# Patient Record
Sex: FEMALE | Race: WHITE | NOT HISPANIC OR LATINO | Employment: FULL TIME | URBAN - METROPOLITAN AREA
[De-identification: names, ages, dates, MRNs, and addresses within clinical notes are randomized per-mention and may not be internally consistent; named-entity substitution may affect disease eponyms.]

---

## 2017-10-09 ENCOUNTER — APPOINTMENT (OUTPATIENT)
Dept: SOCIAL WORK | Facility: CLINIC | Age: 32
End: 2017-10-09

## 2017-10-09 PROCEDURE — 90686 IIV4 VACC NO PRSV 0.5 ML IM: CPT | Performed by: REGISTERED NURSE

## 2017-12-04 ENCOUNTER — OFFICE VISIT (OUTPATIENT)
Dept: INTERNAL MEDICINE | Facility: MEDICAL CENTER | Age: 32
End: 2017-12-04
Payer: COMMERCIAL

## 2017-12-04 VITALS
SYSTOLIC BLOOD PRESSURE: 138 MMHG | OXYGEN SATURATION: 98 % | DIASTOLIC BLOOD PRESSURE: 93 MMHG | TEMPERATURE: 99.1 F | WEIGHT: 171.6 LBS | HEIGHT: 66 IN | HEART RATE: 88 BPM | BODY MASS INDEX: 27.58 KG/M2

## 2017-12-04 DIAGNOSIS — Z13.1 SCREENING FOR DIABETES MELLITUS: ICD-10-CM
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
## 2017-12-04 DIAGNOSIS — Z00.00 HEALTHCARE MAINTENANCE: ICD-10-CM

## 2017-12-04 DIAGNOSIS — F10.10 ALCOHOL ABUSE: ICD-10-CM

## 2017-12-04 DIAGNOSIS — Z13.220 SCREENING FOR LIPID DISORDERS: ICD-10-CM

## 2017-12-04 DIAGNOSIS — Z76.89 ESTABLISHING CARE WITH NEW DOCTOR, ENCOUNTER FOR: ICD-10-CM

## 2017-12-04 PROCEDURE — 99204 OFFICE O/P NEW MOD 45 MIN: CPT | Mod: GC | Performed by: INTERNAL MEDICINE

## 2017-12-04 ASSESSMENT — PAIN SCALES - GENERAL: PAINLEVEL: 5=MODERATE PAIN

## 2017-12-04 ASSESSMENT — PATIENT HEALTH QUESTIONNAIRE - PHQ9: CLINICAL INTERPRETATION OF PHQ2 SCORE: 0

## 2017-12-04 NOTE — PATIENT INSTRUCTIONS
Alcohol Problems  Most adults who drink alcohol drink in moderation (not a lot) are at low risk for developing problems related to their drinking. However, all drinkers, including low-risk drinkers, should know about the health risks connected with drinking alcohol.  RECOMMENDATIONS FOR LOW-RISK DRINKING   Drink in moderation. Moderate drinking is defined as follows:   · Men - no more than 2 drinks per day.  · Nonpregnant women - no more than 1 drink per day.  · Over age 65 - no more than 1 drink per day.  A standard drink is 12 grams of pure alcohol, which is equal to a 12 ounce bottle of beer or wine cooler, a 5 ounce glass of wine, or 1.5 ounces of distilled spirits (such as whiskey, rasheeda, vodka, or rum).   ABSTAIN FROM (DO NOT DRINK) ALCOHOL:  · When pregnant or considering pregnancy.  · When taking a medication that interacts with alcohol.  · If you are alcohol dependent.  · A medical condition that prohibits drinking alcohol (such as ulcer, liver disease, or heart disease).  DISCUSS WITH YOUR CAREGIVER:  · If you are at risk for coronary heart disease, discuss the potential benefits and risks of alcohol use: Light to moderate drinking is associated with lower rates of coronary heart disease in certain populations (for example, men over age 45 and postmenopausal women). Infrequent or nondrinkers are advised not to begin light to moderate drinking to reduce the risk of coronary heart disease so as to avoid creating an alcohol-related problem. Similar protective effects can likely be gained through proper diet and exercise.  · Women and the elderly have smaller amounts of body water than men. As a result women and the elderly achieve a higher blood alcohol concentration after drinking the same amount of alcohol.  · Exposing a fetus to alcohol can cause a broad range of birth defects referred to as Fetal Alcohol Syndrome (FAS) or Alcohol-Related Birth Defects (ARBD). Although FAS/ARBD is connected with  excessive alcohol consumption during pregnancy, studies also have reported neurobehavioral problems in infants born to mothers reporting drinking an average of 1 drink per day during pregnancy.  · Heavier drinking (the consumption of more than 4 drinks per occasion by men and more than 3 drinks per occasion by women) impairs learning (cognitive) and psychomotor functions and increases the risk of alcohol-related problems, including accidents and injuries.  CAGE QUESTIONS:   · Have you ever felt that you should Cut down on your drinking?  · Have people Annoyed you by criticizing your drinking?  · Have you ever felt bad or Guilty about your drinking?  · Have you ever had a drink first thing in the morning to steady your nerves or get rid of a hangover (Eye opener)?  If you answered positively to any of these questions: You may be at risk for alcohol-related problems if alcohol consumption is:   · Men: Greater than 14 drinks per week or more than 4 drinks per occasion.  · Women: Greater than 7 drinks per week or more than 3 drinks per occasion.  Do you or your family have a medical history of alcohol-related problems, such as:  · Blackouts.  · Sexual dysfunction.  · Depression.  · Trauma.  · Liver dysfunction.  · Sleep disorders.  · Hypertension.  · Chronic abdominal pain.  · Has your drinking ever caused you problems, such as problems with your family, problems with your work (or school) performance, or accidents/injuries?  · Do you have a compulsion to drink or a preoccupation with drinking?  · Do you have poor control or are you unable to stop drinking once you have started?  · Do you have to drink to avoid withdrawal symptoms?  · Do you have problems with withdrawal such as tremors, nausea, sweats, or mood disturbances?  · Does it take more alcohol than in the past to get you high?  · Do you feel a strong urge to drink?  · Do you change your plans so that you can have a drink?  · Do you ever drink in the morning to  relieve the shakes or a hangover?  If you have answered a number of the previous questions positively, it may be time for you to talk to your caregivers, family, and friends and see if they think you have a problem. Alcoholism is a chemical dependency that keeps getting worse and will eventually destroy your health and relationships. Many alcoholics end up dead, impoverished, or in detention. This is often the end result of all chemical dependency.  · Do not be discouraged if you are not ready to take action immediately.  · Decisions to change behavior often involve up and down desires to change and feeling like you cannot decide.  · Try to think more seriously about your drinking behavior.  · Think of the reasons to quit.  WHERE TO GO FOR ADDITIONAL INFORMATION   · The National [place] on Alcohol Abuse and Alcoholism (NIAAA)  www.niaaa.nih.gov  · National [place] on Alcoholism and Drug Dependence (NCADD)  www.ncadd.org  · American Society of Addiction Medicine (ASAM)  www.asam.org   Document Released: 12/18/2006 Document Revised: 03/11/2013 Document Reviewed: 08/05/2009  [employer]® Patient Information ©2014 [employer].
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
## 2017-12-15 ENCOUNTER — HOSPITAL ENCOUNTER (OUTPATIENT)
Dept: LAB | Facility: MEDICAL CENTER | Age: 32
End: 2017-12-15
Attending: STUDENT IN AN ORGANIZED HEALTH CARE EDUCATION/TRAINING PROGRAM
Payer: COMMERCIAL

## 2017-12-15 DIAGNOSIS — F10.10 ALCOHOL ABUSE: ICD-10-CM

## 2017-12-15 DIAGNOSIS — Z00.00 HEALTHCARE MAINTENANCE: ICD-10-CM

## 2017-12-15 DIAGNOSIS — Z13.1 SCREENING FOR DIABETES MELLITUS: ICD-10-CM

## 2017-12-15 LAB
ALBUMIN SERPL BCP-MCNC: 3.9 G/DL (ref 3.2–4.9)
ALBUMIN/GLOB SERPL: 1.1 G/DL
ALP SERPL-CCNC: 72 U/L (ref 30–99)
ALT SERPL-CCNC: 11 U/L (ref 2–50)
ANION GAP SERPL CALC-SCNC: 7 MMOL/L (ref 0–11.9)
AST SERPL-CCNC: 17 U/L (ref 12–45)
BASOPHILS # BLD AUTO: 0.8 % (ref 0–1.8)
BASOPHILS # BLD: 0.07 K/UL (ref 0–0.12)
BILIRUB SERPL-MCNC: 0.4 MG/DL (ref 0.1–1.5)
BUN SERPL-MCNC: 13 MG/DL (ref 8–22)
CALCIUM SERPL-MCNC: 9.5 MG/DL (ref 8.5–10.5)
CHLORIDE SERPL-SCNC: 106 MMOL/L (ref 96–112)
CHOLEST SERPL-MCNC: 209 MG/DL (ref 100–199)
CO2 SERPL-SCNC: 25 MMOL/L (ref 20–33)
CREAT SERPL-MCNC: 0.7 MG/DL (ref 0.5–1.4)
EOSINOPHIL # BLD AUTO: 0.2 K/UL (ref 0–0.51)
EOSINOPHIL NFR BLD: 2.4 % (ref 0–6.9)
ERYTHROCYTE [DISTWIDTH] IN BLOOD BY AUTOMATED COUNT: 44.3 FL (ref 35.9–50)
GFR SERPL CREATININE-BSD FRML MDRD: >60 ML/MIN/1.73 M 2
GLOBULIN SER CALC-MCNC: 3.6 G/DL (ref 1.9–3.5)
GLUCOSE SERPL-MCNC: 88 MG/DL (ref 65–99)
HCT VFR BLD AUTO: 43.8 % (ref 37–47)
HDLC SERPL-MCNC: 70 MG/DL
HGB BLD-MCNC: 14.3 G/DL (ref 12–16)
IMM GRANULOCYTES # BLD AUTO: 0.02 K/UL (ref 0–0.11)
IMM GRANULOCYTES NFR BLD AUTO: 0.2 % (ref 0–0.9)
LDLC SERPL CALC-MCNC: 116 MG/DL
LYMPHOCYTES # BLD AUTO: 2.98 K/UL (ref 1–4.8)
LYMPHOCYTES NFR BLD: 35.4 % (ref 22–41)
MCH RBC QN AUTO: 31.6 PG (ref 27–33)
MCHC RBC AUTO-ENTMCNC: 32.6 G/DL (ref 33.6–35)
MCV RBC AUTO: 96.9 FL (ref 81.4–97.8)
MONOCYTES # BLD AUTO: 0.61 K/UL (ref 0–0.85)
MONOCYTES NFR BLD AUTO: 7.2 % (ref 0–13.4)
NEUTROPHILS # BLD AUTO: 4.54 K/UL (ref 2–7.15)
NEUTROPHILS NFR BLD: 54 % (ref 44–72)
NRBC # BLD AUTO: 0 K/UL
NRBC BLD AUTO-RTO: 0 /100 WBC
PLATELET # BLD AUTO: 405 K/UL (ref 164–446)
PMV BLD AUTO: 10.7 FL (ref 9–12.9)
POTASSIUM SERPL-SCNC: 4.3 MMOL/L (ref 3.6–5.5)
PROT SERPL-MCNC: 7.5 G/DL (ref 6–8.2)
RBC # BLD AUTO: 4.52 M/UL (ref 4.2–5.4)
SODIUM SERPL-SCNC: 138 MMOL/L (ref 135–145)
TRIGL SERPL-MCNC: 116 MG/DL (ref 0–149)
WBC # BLD AUTO: 8.4 K/UL (ref 4.8–10.8)

## 2017-12-15 PROCEDURE — 80053 COMPREHEN METABOLIC PANEL: CPT

## 2017-12-15 PROCEDURE — 80061 LIPID PANEL: CPT

## 2017-12-15 PROCEDURE — 36415 COLL VENOUS BLD VENIPUNCTURE: CPT

## 2017-12-15 PROCEDURE — 85025 COMPLETE CBC W/AUTO DIFF WBC: CPT

## 2018-01-04 ENCOUNTER — OFFICE VISIT (OUTPATIENT)
Dept: INTERNAL MEDICINE | Facility: MEDICAL CENTER | Age: 33
End: 2018-01-04
Payer: COMMERCIAL

## 2018-01-04 VITALS
TEMPERATURE: 98.9 F | HEIGHT: 66 IN | HEART RATE: 106 BPM | SYSTOLIC BLOOD PRESSURE: 130 MMHG | BODY MASS INDEX: 27.8 KG/M2 | DIASTOLIC BLOOD PRESSURE: 80 MMHG | WEIGHT: 173 LBS | OXYGEN SATURATION: 100 %

## 2018-01-04 DIAGNOSIS — Z00.00 HEALTHCARE MAINTENANCE: ICD-10-CM

## 2018-01-04 PROCEDURE — 99395 PREV VISIT EST AGE 18-39: CPT | Mod: GC | Performed by: INTERNAL MEDICINE

## 2018-01-04 RX ORDER — DESOGESTREL AND ETHINYL ESTRADIOL AND ETHINYL ESTRADIOL 21-5 (28)
1 KIT ORAL DAILY
COMMUNITY
Start: 2017-12-12 | End: 2019-06-27

## 2018-01-04 NOTE — PROGRESS NOTES
"      Established Patient    Christiane presents today with the following:    CC: Annual preventative exam    HPI:   Lifestyle History:    1. Poisoning (#1 COD)  Has med rec been completed? Yes  Social history of drug and alcohol use complete? Yes  -Drinks alcohol three times a week 1-2 beers.    2. Suicide:   Suicide risk assessed? Yes (Acess life stress, Good social support, No access to weapons, No dangerous relationships, PHQ2 is negative)    3. Traffic accidents:  History of previous traffic accidents? No  Do you wear a seatbelt? Yes  How often do you speed? occassionally   Texting and driving? No   Drunk driving? No  Counseling provided? Yes    4. Homicide:  Patient counseled on reasonable precautions? Yes    STI:  History of high risk sexual behavior? No GC/CL, HIV, RPR testing one year ago was negative    Metabolic Screening:    Symptoms of thyroid dysfunction? No if yes, TSH with reflex T4    Family history of heart disease? Yes What age? 68 father had CAD s/p CABG    Elevated BP? No if yes, then follow up for further BP eval.    Is the patient overweight? Yes If yes, screen for metabolic disorders.    Cancer Screen (women):    Family history of breast cancer at early age? No     Last PAP smear? 13 months ago was normal    Patient Active Problem List    Diagnosis Date Noted   • Fall from ground level 12/04/2017   • Screening for diabetes mellitus 12/04/2017   • Screening for lipid disorders 12/04/2017   • Alcohol abuse 12/04/2017       Current Outpatient Prescriptions   Medication Sig Dispense Refill   • VIORELE 0.15-0.02/0.01 MG (21/5) per tablet Take 1 Tab by mouth every day.       No current facility-administered medications for this visit.        ROS: As per HPI. Additional pertinent symptoms as noted below.    All others negative    /80   Pulse (!) 106   Temp 37.2 °C (98.9 °F)   Ht 1.676 m (5' 6\")   Wt 78.5 kg (173 lb)   SpO2 100%   BMI 27.92 kg/m²     Physical Exam   Constitutional:  oriented " to person, place, and time. No distress.   Eyes: Pupils are equal, round, and reactive to light. No scleral icterus.  Neck: Neck supple. No thyromegaly present.   Cardiovascular: Normal rate, regular rhythm and normal heart sounds.  Exam reveals no gallop and no friction rub.  No murmur heard.  Pulmonary/Chest: Breath sounds normal. Chest wall is not dull to percussion.   Musculoskeletal:   no edema.   Lymphadenopathy: no cervical adenopathy  Neurological: alert and oriented to person, place, and time.   Skin: No cyanosis. Nails show no clubbing.    Note: I have reviewed all pertinent labs and diagnostic tests associated with this visit with specific comments listed under the assessment and plan below    Assessment and Plan    1. Healthcare maintenance:  As stated in the HPI.  -Up to date with vaccinations- Tetanus booster in 2012  -Annual influenza vaccine taken this year.  -Previous pelvic and pap smear are normal  -Lipid profile recently showed elevated total cholesterol and LDL - Patient was counseled regarding lifestyle modifications and exercise  -PHQ2 is negative  -Drinks alcohol three times 1-2 beers per week  -Previous STI testing for Chlamydia,Gonorrhea,HIV,RPR for syphilis is negative.  -Sexually active with a monogamous partner over the last one year and takes OCP s for contraception.      Followup: Return in about 1 year (around 1/4/2019).      Signed by: VERÓNICA EspinozaSMulugeta

## 2018-01-29 ENCOUNTER — TELEPHONE (OUTPATIENT)
Dept: INTERNAL MEDICINE | Facility: MEDICAL CENTER | Age: 33
End: 2018-01-29

## 2018-02-02 NOTE — TELEPHONE ENCOUNTER
Panda Cr M.D.   You 16 hours ago (6:57 PM)      I contacted Ms Wilson to check on her and to get more information about her condition, I left her a voice mail, I will wait for her reply, Last office visit was with Dr. Valenzuela

## 2018-11-05 ENCOUNTER — IMMUNIZATION (OUTPATIENT)
Dept: SOCIAL WORK | Facility: CLINIC | Age: 33
End: 2018-11-05
Payer: COMMERCIAL

## 2018-11-05 DIAGNOSIS — Z23 NEED FOR VACCINATION: ICD-10-CM

## 2018-11-05 PROCEDURE — 90471 IMMUNIZATION ADMIN: CPT | Performed by: REGISTERED NURSE

## 2018-11-05 PROCEDURE — 90686 IIV4 VACC NO PRSV 0.5 ML IM: CPT | Performed by: REGISTERED NURSE

## 2019-05-01 ENCOUNTER — OFFICE VISIT (OUTPATIENT)
Dept: INTERNAL MEDICINE | Facility: MEDICAL CENTER | Age: 34
End: 2019-05-01
Payer: COMMERCIAL

## 2019-05-01 VITALS
HEIGHT: 66 IN | HEART RATE: 94 BPM | SYSTOLIC BLOOD PRESSURE: 122 MMHG | BODY MASS INDEX: 32.43 KG/M2 | WEIGHT: 201.8 LBS | OXYGEN SATURATION: 99 % | DIASTOLIC BLOOD PRESSURE: 94 MMHG

## 2019-05-01 DIAGNOSIS — M75.81 TENDINITIS OF RIGHT ROTATOR CUFF: ICD-10-CM

## 2019-05-01 PROCEDURE — 99213 OFFICE O/P EST LOW 20 MIN: CPT | Mod: GE | Performed by: INTERNAL MEDICINE

## 2019-05-01 RX ORDER — NAPROXEN 500 MG/1
500 TABLET ORAL 2 TIMES DAILY WITH MEALS
Qty: 60 TAB | Refills: 1 | Status: SHIPPED | OUTPATIENT
Start: 2019-05-01 | End: 2022-09-30

## 2019-05-01 ASSESSMENT — PATIENT HEALTH QUESTIONNAIRE - PHQ9: CLINICAL INTERPRETATION OF PHQ2 SCORE: 0

## 2019-05-01 NOTE — PATIENT INSTRUCTIONS
- please  your medication from the pharmacy   - please follow up referral     Rotator Cuff Tendinitis  Rotator cuff tendinitis is inflammation of the tough, cord-like bands that connect muscle to bone (tendons) in your rotator cuff. Your rotator cuff is the collection of all the muscles and tendons that connect your arm to your shoulder. Your rotator cuff holds the head of your upper arm bone (humerus) in the cup (fossa) of your shoulder blade (scapula).  CAUSES  Rotator cuff tendinitis is usually caused by overusing the joint involved.   SIGNS AND SYMPTOMS  · Deep ache in the shoulder also felt on the outside upper arm over the shoulder muscle.  · Point tenderness over the area that is injured.  · Pain comes on gradually and becomes worse with lifting the arm to the side (abduction) or turning it inward (internal rotation).  · May lead to a chronic tear: When a rotator cuff tendon becomes inflamed, it runs the risk of losing its blood supply, causing some tendon fibers to die. This increases the risk that the tendon can fray and partially or completely tear.  DIAGNOSIS  Rotator cuff tendinitis is diagnosed by taking a medical history, performing a physical exam, and reviewing results of imaging exams. The medical history is useful to help determine the type of rotator cuff injury. The physical exam will include looking at the injured shoulder, feeling the injured area, and watching you do range-of-motion exercises. X-ray exams are typically done to rule out other causes of shoulder pain, such as fractures. MRI is the imaging exam usually used for significant shoulder injuries. Sometimes a dye study called CT arthrogram is done, but it is not as widely used as MRI. In some institutions, special ultrasound tests may also be used to aid in the diagnosis.  TREATMENT   Less Severe Cases   · Use of a sling to rest the shoulder for a short period of time. Prolonged use of the sling can cause stiffness, weakness,  and loss of motion of the shoulder joint.  · Anti-inflammatory medicines, such as ibuprofen or naproxen sodium, may be prescribed.  More Severe Cases   · Physical therapy.  · Use of steroid injections into the shoulder joint.  · Surgery.  HOME CARE INSTRUCTIONS   · Use a sling or splint until the pain decreases. Prolonged use of the sling can cause stiffness, weakness, and loss of motion of the shoulder joint.  · Apply ice to the injured area:  ¨ Put ice in a plastic bag.  ¨ Place a towel between your skin and the bag.  ¨ Leave the ice on for 20 minutes, 2-3 times a day.  · Try to avoid use other than gentle range of motion while your shoulder is painful. Use the shoulder and exercise only as directed by your health care provider. Stop exercises or range of motion if pain or discomfort increases, unless directed otherwise by your health care provider.  · Only take over-the-counter or prescription medicines for pain, discomfort, or fever as directed by your health care provider.  · If you were given a shoulder sling and straps (immobilizer), do not remove it except as directed, or until you see a health care provider for a follow-up exam. If you need to remove it, move your arm as little as possible or as directed.  · You may want to sleep on several pillows at night to lessen swelling and pain.  SEEK IMMEDIATE MEDICAL CARE IF:   · Your shoulder pain increases or new pain develops in your arm, hand, or fingers and is not relieved with medicines.  · You have new, unexplained symptoms, especially increased numbness in the hands or loss of strength.  · You develop any worsening of the problems that brought you in for care.  · Your arm, hand, or fingers are numb or tingling.  · Your arm, hand, or fingers are swollen, painful, or turn white or blue.  MAKE SURE YOU:  · Understand these instructions.  · Will watch your condition.  · Will get help right away if you are not doing well or get worse.  This information is not  intended to replace advice given to you by your health care provider. Make sure you discuss any questions you have with your health care provider.  Document Released: 03/09/2005 Document Revised: 01/08/2016 Document Reviewed: 07/30/2014  Elsevier Interactive Patient Education © 2017 [employer]

## 2019-05-01 NOTE — PROGRESS NOTES
Established Patient    Christiane presents today with the following:    CC: Right shoulder pain.    HPI: Christiane Wilson is a 34 y.o. female presented to the clinic because of right shoulder pain.  She has been complaining of the pain for 15 years after a history of fall/trauma, the pain is on and off, around her shoulder, mostly triggered by overuse or overhead activities, mainly abduction and lifting.  Use Advil as needed which sometimes help with the pain.  Denies fever, chills, swelling of the shoulder joint, tingling sensation upper extremity weakness and neck pain.    Patient Active Problem List    Diagnosis Date Noted   • Tendinitis of right rotator cuff 05/01/2019   • Fall from ground level 12/04/2017   • Screening for diabetes mellitus 12/04/2017   • Screening for lipid disorders 12/04/2017   • Alcohol abuse 12/04/2017       Current Outpatient Prescriptions   Medication Sig Dispense Refill   • naproxen (NAPROSYN) 500 MG Tab Take 1 Tab by mouth 2 times a day, with meals. 60 Tab 1   • VIORELE 0.15-0.02/0.01 MG (21/5) per tablet Take 1 Tab by mouth every day.       No current facility-administered medications for this visit.        ROS: As per HPI. Additional pertinent systems as noted below.  Constitutional: Negative for chills and fever.   HENT: Negative for ear pain and sore throat.    Eyes: Negative for discharge and redness.   Respiratory: Negative for cough, hemoptysis, wheezing and stridor.    Cardiovascular: Negative for chest pain, palpitations and leg swelling.   Gastrointestinal: Negative for abdominal pain, constipation, diarrhea, heartburn, nausea and vomiting.   Genitourinary: Negative for dysuria, flank pain and hematuria.   Musculoskeletal: Negative for falls and myalgias.   Skin: Negative for itching and rash.   Neurological: Negative for dizziness, seizures, loss of consciousness and headaches.   Endo/Heme/Allergies: Negative for polydipsia. Does not bruise/bleed easily.  "  Psychiatric/Behavioral: Negative for substance abuse and suicidal ideas.       /94 (BP Location: Left arm, Patient Position: Sitting, BP Cuff Size: Adult)   Pulse 94   Ht 1.676 m (5' 6\")   Wt 91.5 kg (201 lb 12.8 oz)   LMP 04/25/2019 (Approximate)   SpO2 99%   Breastfeeding? No   BMI 32.57 kg/m²     Physical Exam   Constitutional:  oriented to person, place, and time. No distress.   Eyes: Pupils are equal, round, and reactive to light. No scleral icterus.  Neck: Neck supple. No thyromegaly present.   Cardiovascular: Normal rate, regular rhythm and normal heart sounds.  Exam reveals no gallop and no friction rub.  No murmur heard.  Pulmonary/Chest: Breath sounds normal. Chest wall is not dull to percussion.   Musculoskeletal:   no edema.   Right shoulder: Range of motion within normal, mild tenderness anteriorly, positive empty can test, no pain appreciated with resisted flexion of the arm.  Lymphadenopathy: no cervical adenopathy  Neurological: alert and oriented to person, place, and time.   Skin: No cyanosis. Nails show no clubbing.      Note: I have reviewed all pertinent labs and diagnostic tests associated with this visit with specific comments listed under the assessment and plan below    Assessment and Plan    1. Tendinitis of right rotator cuff  -On and off shoulder pain for 15 years following a history of trauma.  -Pain mainly triggered by overuse and overhead activities.  -Pain usually settles with using Advil.  -On examination normal range of motion, positive empty can test.  -Most likely patient having rotator cuff tendinitis causing her symptoms, no weakness or numbness appreciated, unlikely to be biceps tendinitis or cervical radiculopathy.  -Placed referral to physical therapy.  -Started the patient on naproxen 500 mg twice daily.  -Instructed the patient to follow-up if symptoms worsen or fail to improve, she has appointment for her annual visit by the end of the month.  -Might " consider referral to Ortho for possible steroid injection and or imaging.  If symptoms fail to improve.      Followup: Return if symptoms worsen or fail to improve.      Signed by: Berlin Morales M.D.

## 2019-06-07 ENCOUNTER — PHYSICAL THERAPY (OUTPATIENT)
Dept: PHYSICAL THERAPY | Facility: REHABILITATION | Age: 34
End: 2019-06-07
Attending: STUDENT IN AN ORGANIZED HEALTH CARE EDUCATION/TRAINING PROGRAM
Payer: COMMERCIAL

## 2019-06-07 ENCOUNTER — HOSPITAL ENCOUNTER (OUTPATIENT)
Dept: LAB | Facility: MEDICAL CENTER | Age: 34
End: 2019-06-07
Attending: STUDENT IN AN ORGANIZED HEALTH CARE EDUCATION/TRAINING PROGRAM
Payer: COMMERCIAL

## 2019-06-07 DIAGNOSIS — E78.5 DYSLIPIDEMIA: ICD-10-CM

## 2019-06-07 DIAGNOSIS — M75.81 TENDINITIS OF RIGHT ROTATOR CUFF: ICD-10-CM

## 2019-06-07 LAB
ALBUMIN SERPL BCP-MCNC: 3.9 G/DL (ref 3.2–4.9)
ALBUMIN/GLOB SERPL: 1.1 G/DL
ALP SERPL-CCNC: 88 U/L (ref 30–99)
ALT SERPL-CCNC: 10 U/L (ref 2–50)
ANION GAP SERPL CALC-SCNC: 8 MMOL/L (ref 0–11.9)
AST SERPL-CCNC: 16 U/L (ref 12–45)
BASOPHILS # BLD AUTO: 0.5 % (ref 0–1.8)
BASOPHILS # BLD: 0.05 K/UL (ref 0–0.12)
BILIRUB SERPL-MCNC: 0.4 MG/DL (ref 0.1–1.5)
BUN SERPL-MCNC: 12 MG/DL (ref 8–22)
CALCIUM SERPL-MCNC: 9.2 MG/DL (ref 8.5–10.5)
CHLORIDE SERPL-SCNC: 105 MMOL/L (ref 96–112)
CHOLEST SERPL-MCNC: 213 MG/DL (ref 100–199)
CO2 SERPL-SCNC: 23 MMOL/L (ref 20–33)
CREAT SERPL-MCNC: 0.78 MG/DL (ref 0.5–1.4)
EOSINOPHIL # BLD AUTO: 0.21 K/UL (ref 0–0.51)
EOSINOPHIL NFR BLD: 1.9 % (ref 0–6.9)
ERYTHROCYTE [DISTWIDTH] IN BLOOD BY AUTOMATED COUNT: 46 FL (ref 35.9–50)
FASTING STATUS PATIENT QL REPORTED: NORMAL
GLOBULIN SER CALC-MCNC: 3.6 G/DL (ref 1.9–3.5)
GLUCOSE SERPL-MCNC: 88 MG/DL (ref 65–99)
HCT VFR BLD AUTO: 42.4 % (ref 37–47)
HDLC SERPL-MCNC: 69 MG/DL
HGB BLD-MCNC: 13.3 G/DL (ref 12–16)
IMM GRANULOCYTES # BLD AUTO: 0.04 K/UL (ref 0–0.11)
IMM GRANULOCYTES NFR BLD AUTO: 0.4 % (ref 0–0.9)
LDLC SERPL CALC-MCNC: 125 MG/DL
LYMPHOCYTES # BLD AUTO: 3.1 K/UL (ref 1–4.8)
LYMPHOCYTES NFR BLD: 28.3 % (ref 22–41)
MCH RBC QN AUTO: 30 PG (ref 27–33)
MCHC RBC AUTO-ENTMCNC: 31.4 G/DL (ref 33.6–35)
MCV RBC AUTO: 95.5 FL (ref 81.4–97.8)
MONOCYTES # BLD AUTO: 0.77 K/UL (ref 0–0.85)
MONOCYTES NFR BLD AUTO: 7 % (ref 0–13.4)
NEUTROPHILS # BLD AUTO: 6.77 K/UL (ref 2–7.15)
NEUTROPHILS NFR BLD: 61.9 % (ref 44–72)
NRBC # BLD AUTO: 0 K/UL
NRBC BLD-RTO: 0 /100 WBC
PLATELET # BLD AUTO: 420 K/UL (ref 164–446)
PMV BLD AUTO: 10.7 FL (ref 9–12.9)
POTASSIUM SERPL-SCNC: 3.9 MMOL/L (ref 3.6–5.5)
PROT SERPL-MCNC: 7.5 G/DL (ref 6–8.2)
RBC # BLD AUTO: 4.44 M/UL (ref 4.2–5.4)
SODIUM SERPL-SCNC: 136 MMOL/L (ref 135–145)
TRIGL SERPL-MCNC: 96 MG/DL (ref 0–149)
WBC # BLD AUTO: 10.9 K/UL (ref 4.8–10.8)

## 2019-06-07 PROCEDURE — 97110 THERAPEUTIC EXERCISES: CPT

## 2019-06-07 PROCEDURE — 97162 PT EVAL MOD COMPLEX 30 MIN: CPT

## 2019-06-07 PROCEDURE — 80061 LIPID PANEL: CPT

## 2019-06-07 PROCEDURE — 97014 ELECTRIC STIMULATION THERAPY: CPT

## 2019-06-07 PROCEDURE — 36415 COLL VENOUS BLD VENIPUNCTURE: CPT

## 2019-06-07 PROCEDURE — 80053 COMPREHEN METABOLIC PANEL: CPT

## 2019-06-07 PROCEDURE — 85025 COMPLETE CBC W/AUTO DIFF WBC: CPT

## 2019-06-07 SDOH — ECONOMIC STABILITY: GENERAL: QUALITY OF LIFE: GOOD

## 2019-06-07 ASSESSMENT — ENCOUNTER SYMPTOMS
ALLEVIATING FACTORS: ACTIVITY MODIFICATION
ALLEVIATING FACTORS: REST
ALLEVIATING FACTORS: ICE
PAIN TIMING: CONSTANT

## 2019-06-07 NOTE — OP THERAPY EVALUATION
"  Outpatient Physical Therapy  INITIAL EVALUATION    [facility]  [address]  [address]  [place] [address]  Phone:  [phone]  Fax:  [phone]    Date of Evaluation: 06/07/2019    Patient: Christiane Wilson  YOB: 1985  MRN: [id number]     Referring Provider: Berlin Morales M.D.  [address]  [address]  [place] [address]   Referring Diagnosis Tendinitis of right rotator cuff [M75.81]     Time Calculation  Start time: 1030  Stop time: 1115 Time Calculation (min): 45 minutes     Physical Therapy Occurrence Codes    Date of onset of impairment:  6/7/09   Date physical therapy care plan established or reviewed:  6/7/19   Date physical therapy treatment started:  6/7/19          Chief Complaint: Shoulder Injury    Visit Diagnoses     ICD-10-CM   1. Tendinitis of right rotator cuff M75.81         Subjective:   History of Present Illness:     Date of onset:  6/7/2004    Mechanism of injury:  R shoulder pain off and on for years.  Started with fall about 15 years ago.  Recently worsened with extra studying, writing, desk work. (just passed  exam).  [place] in [place] for a couple days, which made her decide to go to physician. R shoulder is not very painful, it just has \" a lot of feels\".  Feels heavy or not normal.  Movements that don't feel anything on L side, feels something on R side.  Currently trying to get back into yoga for regular exercise and wellness.  Did yoga last night, and feeling sore today.  Slipped on ice in Jan 2017, got whiplash and had PT for that.  Unsure if that contributed to R shoulder injury/symptoms.  Had chiropractor tx for shoulder pretty regularly about 5 years ago, but it clearly didn't have a long term effect.    Quality of life:  Good  Sleep disturbance:  Not disrupted  Pain:     Location:  Right anterior shoulder, sometimes down arm, and medial scapula    Quality: heavy, full.    Pain timing:  Constant    " Relieving factors:  Ice, rest and activity modification (ibuprofen prn (sometimes multiple times daily, other times none for a week at a time).  Switched to a vertical mouse at work.)    Exacerbated by: putting on coat, yoga, writing, desk work.    Progression:  Stable  Hand dominance:  Right  Diagnostic Tests:     None    Treatments:     Previous treatment:  Chiropractic    Current treatment:  Ice, medication and activity modification  Patient Goals:     Patient goals for therapy:  Decreased pain and return to sport/leisure activities      Past Medical History:   Diagnosis Date   • Seasonal allergies      History reviewed. No pertinent surgical history.  Social History   Substance Use Topics   • Smoking status: Former Smoker     Packs/day: 0.25     Years: 5.00   • Smokeless tobacco: Current User   • Alcohol use 3.6 oz/week     6 Cans of beer per week     Family and Occupational History     Social History   • Marital status:      Spouse name: N/A   • Number of children: N/A   • Years of education: N/A       Objective     Postural Observations  Seated posture: fair        Cervical Screen    Cervical range of motion within normal limits  Cervical range of motion within normal limits with the following exceptions: L sidebend mildly limited versus right    Tenderness     Right Shoulder  Tenderness in the AC joint, medial scapula and SC joint.     Active Range of Motion   Left Shoulder   Flexion: WFL  Abduction: WFL  External rotation 0°: 70 degrees     Right Shoulder   Flexion: WFL  Abduction: WFL  External rotation 0°: 53 degrees   External rotation BTH: C7 with pain  Internal rotation BTB: mid thoracic     Scapular Mobility     Right Shoulder   Scapular mobility: good    Scapular Mobility beyond 90° FF   Upward rotation: inadequate    Joint Play     Right Shoulder     Anterior capsule: within functional limits    Posterior capsule: hypomobile    Inferior capsule: hypomobile    AC joint: hypomobile        "Comments: pain    Strength:      Left Shoulder   Normal muscle strength    Right Shoulder   Planes of Motion   Flexion: 5   Extension: 5 (pain)   Abduction: 5 (pain)   External rotation at 0°: 4+   Internal rotation at 0°: 5   Isolated Muscles   Biceps: 5   Rhomboids: 4+ (feels \"odd\")   Triceps: 5     Tests     Left Shoulder   Negative Spurling's sign.     Right Shoulder   Positive AC shear, posterior load and shift, Spurling's sign and ULTT3.   Negative anterior load and shift, apprehension, belly press, empty can, full can, Hawkin's, Speed's, ULTT1 and ULTT4.     Additional Tests Details  R Spurling's test positive for \"some feeling\" in the R shoulder.        Therapeutic Exercises (CPT 60155):     1. Scap retraction    2. Posterior scalene stretch    3. B shoulder ER with medium resistance band    4. Rows with medium resistance band    5. Serratus wall slide    6. Ws at wall      Therapeutic Exercise Summary: Provided handout with these exercises for daily HEP.    Therapeutic Treatments and Modalities:     1. E Stim Unattended (CPT 60595), IFC and MH to R AC and GH joints, x15 min in supine    Time-based treatments/modalities:  Therapeutic exercise minutes (CPT 76642): 10 minutes       Assessment, Response and Plan:   Impairments: difficulty performing job, lacks appropriate home exercise program and pain with function    Assessment details:  34 y.o. female presents with R shoulder symptoms, recurrent.  Pt demonstrates postural dysfunction, mild weakness, and impaired scapular rhythm.  Pt will benefit from skilled PT services to improve postural endurance, RC strength, and pain free function.  Barriers to therapy:  None  Prognosis: good    Goals:   Short Term Goals:   1. Pt will demonstrate R shoulder AROM ER > 65 degrees.  2. Pt will demonstrate improved sitting posture.  3. Pt will not have increased symptoms following 2 hours of desk work.  4. Pt will be independent with daily HEP.  Short term goal time span:  " 2-4 weeks      Long Term Goals:    1. Pt will require less than 4 doses of ibuprofen per week.  2. Pt will participate in yoga 3x/week with minimal to no increase in R shoulder symptoms.  3. Pt will report increased function via improved scores on QuickDash.  Long term goal time span:  6-8 weeks    Plan:   Therapy options:  Physical therapy treatment to continue  Planned therapy interventions:  E Stim Unattended (CPT 93483), Manual Therapy (CPT 55792), Neuromuscular Re-education (CPT 04911) and Therapeutic Activities (CPT 84710)  Frequency:  2x week  Duration in visits:  12  Discussed with:  Patient  Plan details:  May decrease frequency as patient progresses.      Functional Limitations   Quickdash General Total Score: 20.45               Referring provider co-signature:  I have reviewed this plan of care and my co-signature certifies the need for services.  Certification Dates:   From 6/7/19     To 8/1/19    Physician Signature: ________________________________ Date: ______________

## 2019-06-11 ENCOUNTER — PHYSICAL THERAPY (OUTPATIENT)
Dept: PHYSICAL THERAPY | Facility: REHABILITATION | Age: 34
End: 2019-06-11
Attending: STUDENT IN AN ORGANIZED HEALTH CARE EDUCATION/TRAINING PROGRAM
Payer: COMMERCIAL

## 2019-06-11 DIAGNOSIS — M75.81 TENDINITIS OF RIGHT ROTATOR CUFF: ICD-10-CM

## 2019-06-11 PROCEDURE — 97014 ELECTRIC STIMULATION THERAPY: CPT

## 2019-06-11 PROCEDURE — 97110 THERAPEUTIC EXERCISES: CPT

## 2019-06-11 PROCEDURE — 97140 MANUAL THERAPY 1/> REGIONS: CPT

## 2019-06-11 NOTE — OP THERAPY DAILY TREATMENT
Outpatient Physical Therapy  DAILY TREATMENT     [facility] Physical [address]  [address]  [place] [address]  Phone:  [phone]  Fax:  [phone]    Date: 06/11/2019    Patient: Christiane Wilson  YOB: 1985  MRN: [id number]     Time Calculation  Start time: 1600  Stop time: 1645 Time Calculation (min): 45 minutes     Chief Complaint: Shoulder Problem    Visit #: 2    SUBJECTIVE:  Doing HEP sometimes, I've got a plan to get my  to help me do it more regularly.  No change in symptoms since eval.  Did touch up painting this weekend with small paint roller without increased symptoms.    OBJECTIVE:        Therapeutic Exercises (CPT 96182):     1. Supine OH flexion with wand, x10    2. Supine serratus punch, x15    3. Rows with medium theratubing, x15    4. B shoulder ext with medium theratubing, x15    5. Scap retraction on 1/2 FR supine, x15    6. Sh horiz abd/add on 1/2 FR, x15    7. Serratus wall slide with low trap lift off, x10    8. Prone shoulder ext, x10, then x10 with 2#    9. Prone shoulder horiz abd, x10, then x10 with 2#    10. Prone scapular retraction, x10, then x10 with 2#    Therapeutic Treatments and Modalities:     1. Manual Therapy (CPT 13873), R shoulder    2. E Stim Unattended (CPT 50614), IFC and MH to R shoulder, x15 min    Therapeutic Treatment and Modalities Summary: R GHJ posterior and inf mobs Gr II-III.  R scap glides and levator scap stretches.    Time-based treatments/modalities:  Manual therapy minutes (CPT 58988): 10 minutes  Therapeutic exercise minutes (CPT 95842): 20 minutes       ASSESSMENT:   Response to treatment: Odd symptoms or feelings reported at medial R scapula and superior R shoulder with some therex.    PLAN/RECOMMENDATIONS:   Plan for treatment: therapy treatment to continue next visit.  Planned interventions for next visit: continue with current treatment.

## 2019-06-13 ENCOUNTER — PHYSICAL THERAPY (OUTPATIENT)
Dept: PHYSICAL THERAPY | Facility: REHABILITATION | Age: 34
End: 2019-06-13
Attending: STUDENT IN AN ORGANIZED HEALTH CARE EDUCATION/TRAINING PROGRAM
Payer: COMMERCIAL

## 2019-06-13 DIAGNOSIS — M75.81 TENDINITIS OF RIGHT ROTATOR CUFF: ICD-10-CM

## 2019-06-13 PROCEDURE — 97140 MANUAL THERAPY 1/> REGIONS: CPT

## 2019-06-13 PROCEDURE — 97110 THERAPEUTIC EXERCISES: CPT

## 2019-06-13 PROCEDURE — 97014 ELECTRIC STIMULATION THERAPY: CPT

## 2019-06-13 NOTE — OP THERAPY DAILY TREATMENT
"  Outpatient Physical Therapy  DAILY TREATMENT     [facility] Physical [address]  [address]  [place] [address]  Phone:  [phone]  Fax:  [phone]    Date: 06/13/2019    Patient: Christiane Wilson  YOB: 1985  MRN: [id number]     Time Calculation  Start time: 1600  Stop time: 1645 Time Calculation (min): 45 minutes     Chief Complaint: Shoulder Problem    Visit #: 3    SUBJECTIVE:  Felt ok after last therapy session.  Feeling a little pain medial to R scapula.  Tired today, feeling \"like a weenie\"    OBJECTIVE:        Therapeutic Exercises (CPT 89631):     1. Supine OH flexion with 2# wand, x10    2. Serratus wall push up, x15    3. Rows with 10# pulley, x15    4. B shoulder ext with 10# pulley, x15    5. Corner pec stretch, x30 sec    6. B scaption with 2#, x15    7. Serratus wall slide with low trap lift off, x15    8. Prone shoulder ext, x15 2#    9. Prone shoulder horiz abd, x15 2#    10. Prone rows, x15 2#    11. Wall push ups, x15      Therapeutic Exercise Summary: Add corner pec stretch, prone shoulder ext, prone shoulder horiz abd, and prone rows to HEP.    Therapeutic Treatments and Modalities:     1. Manual Therapy (CPT 85319), R shoulder    2. E Stim Unattended (CPT 77741), IFC and MH to R shoulder/scapular region, x15 min    Therapeutic Treatment and Modalities Summary: R GHJ inf mobs Gr II-III with PROM.  R scap glides.  STM R mid thoracic paraspinals and mid trap, rhomboids.    Time-based treatments/modalities:  Manual therapy minutes (CPT 39989): 8 minutes  Therapeutic exercise minutes (CPT 97886): 22 minutes       ASSESSMENT:   Response to treatment: Limited by postural weakness.    PLAN/RECOMMENDATIONS:   Plan for treatment: therapy treatment to continue next visit.  Planned interventions for next visit: continue with current treatment.      "

## 2019-06-18 ENCOUNTER — PHYSICAL THERAPY (OUTPATIENT)
Dept: PHYSICAL THERAPY | Facility: REHABILITATION | Age: 34
End: 2019-06-18
Attending: STUDENT IN AN ORGANIZED HEALTH CARE EDUCATION/TRAINING PROGRAM
Payer: COMMERCIAL

## 2019-06-18 DIAGNOSIS — M75.81 TENDINITIS OF RIGHT ROTATOR CUFF: ICD-10-CM

## 2019-06-18 PROCEDURE — 97014 ELECTRIC STIMULATION THERAPY: CPT

## 2019-06-18 PROCEDURE — 97140 MANUAL THERAPY 1/> REGIONS: CPT

## 2019-06-18 PROCEDURE — 97110 THERAPEUTIC EXERCISES: CPT

## 2019-06-18 NOTE — OP THERAPY DAILY TREATMENT
Outpatient Physical Therapy  DAILY TREATMENT     [facility] Physical [address]  [address]  [place] [address]  Phone:  [phone]  Fax:  [phone]    Date: 06/18/2019    Patient: Christiane Wilson  YOB: 1985  MRN: [id number]     Time Calculation  Start time: 1530  Stop time: 1615 Time Calculation (min): 45 minutes     Chief Complaint: Shoulder Problem    Visit #: 4    SUBJECTIVE:  I have this weird pain around medial inferior R scapula.    OBJECTIVE:        Therapeutic Exercises (CPT 46251):     1. Supine alt R/L UE flexion on 1/2 FR, x10    2. Supine serratus punch on 1/2 FR, x15    3. Rows with 15# pulley, x15    4. B shoulder ext with 15# pulley, x15    5. Corner pec stretch, x30 sec    7. Serratus wall slide with low trap lift off, x15    8. Prone B shoulder ext over ball, x10    9. Prone B shoulder horiz abd over ball, x10    10. Prone B rows over ball, x10 2#    11. Wall push ups, x15    12. Scap retraction on 1/2 FR, x15    Therapeutic Treatments and Modalities:     1. Manual Therapy (CPT 31368), R shoulder    2. E Stim Unattended (CPT 47847), IFC and MH to R shoulder/scapular region, x15 min    Therapeutic Treatment and Modalities Summary: R GHJ inf mobs Gr II-III with PROM.  R scap glides.  STM R upper trap, levator scap, and mid trap, thoracic paraspinals.    Time-based treatments/modalities:  Manual therapy minutes (CPT 30953): 8 minutes  Therapeutic exercise minutes (CPT 50475): 17 minutes       ASSESSMENT:   Response to treatment: Improving strength and nichole for strengthening exercise.    PLAN/RECOMMENDATIONS:   Plan for treatment: therapy treatment to continue next visit.  Planned interventions for next visit: continue with current treatment.  Progress resistance exercises at shoulder height and above and with thoracic strengthening.

## 2019-06-20 ENCOUNTER — APPOINTMENT (OUTPATIENT)
Dept: PHYSICAL THERAPY | Facility: REHABILITATION | Age: 34
End: 2019-06-20
Attending: STUDENT IN AN ORGANIZED HEALTH CARE EDUCATION/TRAINING PROGRAM
Payer: COMMERCIAL

## 2019-06-21 ENCOUNTER — PHYSICAL THERAPY (OUTPATIENT)
Dept: PHYSICAL THERAPY | Facility: REHABILITATION | Age: 34
End: 2019-06-21
Attending: STUDENT IN AN ORGANIZED HEALTH CARE EDUCATION/TRAINING PROGRAM
Payer: COMMERCIAL

## 2019-06-21 DIAGNOSIS — M75.81 TENDINITIS OF RIGHT ROTATOR CUFF: ICD-10-CM

## 2019-06-21 PROCEDURE — 97014 ELECTRIC STIMULATION THERAPY: CPT

## 2019-06-21 PROCEDURE — 97110 THERAPEUTIC EXERCISES: CPT

## 2019-06-21 PROCEDURE — 97140 MANUAL THERAPY 1/> REGIONS: CPT

## 2019-06-21 NOTE — OP THERAPY DAILY TREATMENT
Outpatient Physical Therapy  DAILY TREATMENT     [facility] Physical [address]  [address]  [place] [address]  Phone:  [phone]  Fax:  [phone]    Date: 06/21/2019    Patient: Christiane Wilson  YOB: 1985  MRN: [id number]     Time Calculation  Start time: 1100  Stop time: 1150 Time Calculation (min): 50 minutes     Chief Complaint: Shoulder Problem    Visit #: 5    SUBJECTIVE:  Sore yesterday from doing exercises.  I ordered an exercise ball for home.    OBJECTIVE:        Therapeutic Exercises (CPT 77743):     1. Supine alt R/L UE flexion on 1/2 FR, x10, with 2# handweights    2. Supine serratus punch on 1/2 FR, x15, with 2# handweights    3. Rows with 15# pulley, x15    4. B shoulder ext with 15# pulley, x15    5. Corner pec stretch, x30 sec    6. Ball circles on wall, x10 CW/CCW at shoulder height    8. Prone B shoulder ext over ball, x10    9. Prone B shoulder horiz abd over ball, x10    10. Prone B rows over ball, x10 2#    11. Incline push ups, x15      Therapeutic Exercise Summary: Add prone over ball exercises and table push ups to HEP.    Therapeutic Treatments and Modalities:     1. Manual Therapy (CPT 09016), R shoulder    2. E Stim Unattended (CPT 14587), IFC and MH to R shoulder/scapular region, x15 min    Therapeutic Treatment and Modalities Summary: R GHJ inf mobs Gr II-III with PROM.  R scap glides.  STM R upper trap, levator scap, and mid trap, thoracic paraspinals.  Paraesthesias reported at lateral elbow with GHJ mobs.  Resolved after manual therapy.    Time-based treatments/modalities:  Manual therapy minutes (CPT 02365): 10 minutes  Therapeutic exercise minutes (CPT 02011): 20 minutes     ASSESSMENT:   Response to treatment: Muscle fatigue with progressive resistance training.    PLAN/RECOMMENDATIONS:   Plan for treatment: therapy treatment to continue next visit.  Planned interventions for next visit: continue with current treatment.  Continue  increasing resistance exercise and working strength at shoulder height or above.

## 2019-06-25 ENCOUNTER — APPOINTMENT (OUTPATIENT)
Dept: PHYSICAL THERAPY | Facility: REHABILITATION | Age: 34
End: 2019-06-25
Attending: STUDENT IN AN ORGANIZED HEALTH CARE EDUCATION/TRAINING PROGRAM
Payer: COMMERCIAL

## 2019-06-27 ENCOUNTER — APPOINTMENT (OUTPATIENT)
Dept: PHYSICAL THERAPY | Facility: REHABILITATION | Age: 34
End: 2019-06-27
Attending: STUDENT IN AN ORGANIZED HEALTH CARE EDUCATION/TRAINING PROGRAM
Payer: COMMERCIAL

## 2019-06-27 ENCOUNTER — OFFICE VISIT (OUTPATIENT)
Dept: INTERNAL MEDICINE | Facility: MEDICAL CENTER | Age: 34
End: 2019-06-27
Payer: COMMERCIAL

## 2019-06-27 VITALS
HEIGHT: 66 IN | OXYGEN SATURATION: 97 % | BODY MASS INDEX: 32.62 KG/M2 | WEIGHT: 203 LBS | SYSTOLIC BLOOD PRESSURE: 126 MMHG | DIASTOLIC BLOOD PRESSURE: 84 MMHG | HEART RATE: 115 BPM | TEMPERATURE: 96.9 F

## 2019-06-27 DIAGNOSIS — Z00.00 HEALTH MAINTENANCE EXAMINATION: ICD-10-CM

## 2019-06-27 DIAGNOSIS — M75.81 TENDINITIS OF RIGHT ROTATOR CUFF: ICD-10-CM

## 2019-06-27 PROCEDURE — 97110 THERAPEUTIC EXERCISES: CPT

## 2019-06-27 PROCEDURE — 99395 PREV VISIT EST AGE 18-39: CPT | Mod: GE | Performed by: INTERNAL MEDICINE

## 2019-06-27 RX ORDER — ETONOGESTREL AND ETHINYL ESTRADIOL VAGINAL RING .015; .12 MG/D; MG/D
1 RING VAGINAL
COMMUNITY
End: 2022-09-30

## 2019-06-27 ASSESSMENT — ENCOUNTER SYMPTOMS
EYE REDNESS: 0
GENERAL WELL-BEING: EXCELLENT
POLYDIPSIA: 0
NAUSEA: 0
MEMORY LOSS: 0
VOMITING: 0
ABDOMINAL PAIN: 0
WEIGHT LOSS: 0
NERVOUS/ANXIOUS: 0
SPUTUM PRODUCTION: 0
FLANK PAIN: 0
EYE PAIN: 0
COUGH: 0
STRIDOR: 0
LOSS OF CONSCIOUSNESS: 0
ORTHOPNEA: 0
EYE DISCHARGE: 0
FOCAL WEAKNESS: 0
PALPITATIONS: 0
SEIZURES: 0
DEPRESSION: 0
BACK PAIN: 0
HEARTBURN: 0
MYALGIAS: 0
HEMOPTYSIS: 0
FEVER: 0
SORE THROAT: 0
NECK PAIN: 0
CHILLS: 0

## 2019-06-27 ASSESSMENT — ACTIVITIES OF DAILY LIVING (ADL): BATHING_REQUIRES_ASSISTANCE: 0

## 2019-06-27 ASSESSMENT — PATIENT HEALTH QUESTIONNAIRE - PHQ9: CLINICAL INTERPRETATION OF PHQ2 SCORE: 0

## 2019-06-27 NOTE — PROGRESS NOTES
Annual Wellness Visit       CC: Annual Preventive Health Assessment      HPI: Christiane Wilson is a 34 y.o. female presented to the clinic today for annual wellness visit.          Patient Active Problem List    Diagnosis Date Noted   • Tendinitis of right rotator cuff 2019   • Fall from ground level 2017   • Screening for diabetes mellitus 2017   • Screening for lipid disorders 2017   • Alcohol abuse 2017       Past Medical History:   Diagnosis Date   • Seasonal allergies        Current Outpatient Prescriptions   Medication Sig Dispense Refill   • ethinyl estradiol-etonogestrel (NUVARING) 0.12-0.015 MG/24HR vaginal ring Insert 1 Each in vagina.     • naproxen (NAPROSYN) 500 MG Tab Take 1 Tab by mouth 2 times a day, with meals. 60 Tab 1   • VIORELE 0.15-0.02/0.01 MG () per tablet Take 1 Tab by mouth every day.       No current facility-administered medications for this visit.        Allergies as of 2019   • (No Known Allergies)       Social History     Social History   • Marital status:      Spouse name: N/A   • Number of children: N/A   • Years of education: N/A     Occupational History   • Not on file.     Social History Main Topics   • Smoking status: Former Smoker     Packs/day: 0.25     Years: 5.00   • Smokeless tobacco: Current User   • Alcohol use 3.6 oz/week     6 Cans of beer per week   • Drug use: No   • Sexual activity: Yes     Partners: Male     Birth control/ protection: Other-See Comments      Comment: Nuvaring     Other Topics Concern   • Not on file     Social History Narrative   • No narrative on file       Family History   Problem Relation Age of Onset   • Heart Attack Father         at age 68 CAD s/p CABG   • Heart Attack Maternal Grandfather          atr age 50s   • Stroke Paternal Grandmother    • Colon Cancer Paternal Grandmother    • Heart Disease Paternal Grandfather          at age 93       History reviewed. No pertinent surgical  history.     Review of Systems   Constitutional: Negative for chills, fever and weight loss.   HENT: Negative for congestion and sore throat.    Eyes: Negative for pain, discharge and redness.   Respiratory: Negative for cough, hemoptysis, sputum production and stridor.    Cardiovascular: Negative for chest pain, palpitations and orthopnea.   Gastrointestinal: Negative for abdominal pain, heartburn, nausea and vomiting.   Genitourinary: Negative for flank pain, frequency and hematuria.   Musculoskeletal: Negative for back pain, myalgias and neck pain.   Skin: Negative for itching and rash.   Neurological: Negative for focal weakness, seizures and loss of consciousness.   Endo/Heme/Allergies: Negative for environmental allergies and polydipsia.   Psychiatric/Behavioral: Negative for depression, memory loss and suicidal ideas. The patient is not nervous/anxious.        Health Risk Assessment   Depression Screening    Little interest or pleasure in doing things?  0 - not at all  Feeling down, depressed , or hopeless? 0 - not at all  Trouble falling or staying asleep, or sleeping too much?     Feeling tired or having little energy?     Poor appetite or overeating?     Feeling bad about yourself - or that you are a failure or have let yourself or your family down?    Trouble concentrating on things, such as reading the newspaper or watching television?    Moving or speaking so slowly that other people could have noticed.  Or the opposite - being so fidgety or restless that you have been moving around a lot more than usual?     Thoughts that you would be better off dead, or of hurting yourself?     Patient Health Questionnaire Score:      If depressive symptoms identified deferred to follow up visit unless specifically addressed in assessment and plan.    Interpretation of PHQ-9 Total Score   Score Severity   1-4 No Depression   5-9 Mild Depression   10-14 Moderate Depression   15-19 Moderately Severe Depression   20-27  "Severe Depression      Screening for Cognitive Impairment    Three Minute Recall (village, kitchen, baby) 2/3    Domo clock face with all 12 numbers and set the hands to show 10 past 10.  Yes    Cognitive concerns identified deferred for follow up unless specifically addressed in assessment and plan.    Fall Risk Assessment    Has the patient had two or more falls in the last year or any fall with injury in the last year?  No    Safety Assessment    Throw rugs on floor.  No  Handrails on all stairs.  Yes  Good lighting in all hallways.  Yes  Difficulty hearing.  No  Patient counseled about all safety risks that were identified.    Functional Assessment ADLs    Are there any barriers preventing you from cooking for yourself or meeting nutritional needs?  No.    Are there any barriers preventing you from driving safely or obtaining transportation?  No.    Are there any barriers preventing you from using a telephone or calling for help?  No.    Are there any barriers preventing you from shopping?  No.    Are there any barriers preventing you from taking care of your own finances?  No.    Are there any barriers preventing you from managing your medications?  No.    Are there any barriers preventing you from showering, bathing or dressing yourself? No.    Are you currently engaging in any exercise or physical activity?  Yes.     What is your perception of your health?  Excellent.      Health Maintenance Summary                IMM HEP B VACCINE Overdue 3/21/2002     IMM DTaP/Tdap/Td Vaccine Overdue 3/21/2002     PAP SMEAR Overdue 3/21/2004           Patient Care Team:  Panda Cr M.D. as PCP - General (Internal Medicine)  Dona Valentino PT as Physical Therapy (Physical Therapy)      /84 (BP Location: Left arm, Patient Position: Sitting, BP Cuff Size: Large adult)   Pulse (!) 115   Temp 36.1 °C (96.9 °F) (Temporal)   Ht 1.676 m (5' 6\")   Wt 92.1 kg (203 lb)   SpO2 97%   BMI 32.77 kg/m² "       Physical Exam   Constitutional: She is oriented to person, place, and time and well-developed, well-nourished, and in no distress. No distress.   HENT:   Head: Normocephalic and atraumatic.   Eyes: Pupils are equal, round, and reactive to light. No scleral icterus.   Neck: Normal range of motion. No thyromegaly present.   Cardiovascular: Exam reveals no gallop and no friction rub.    No murmur heard.  Pulmonary/Chest: Effort normal. No respiratory distress. She has no wheezes. She has no rales.   Abdominal: Soft. She exhibits no distension. There is no tenderness.   Musculoskeletal: She exhibits no edema or tenderness.   Lymphadenopathy:     She has no cervical adenopathy.   Neurological: She is alert and oriented to person, place, and time. No cranial nerve deficit. Gait normal. GCS score is 15.   Skin: No rash noted. She is not diaphoretic. No erythema.   Psychiatric: Mood, affect and judgment normal.       Assessment and Plan  Annual wellness visit   34 y.o.female is here for preventive health visit, patient preventive services needs include       Cancer:    · Lung cancer: less than 30 PPY smoking within the last 15 years   · Breast Cancer: last mammogram was done in not indicated.  ·  Smear: last PAP smear was done in 18 months ago ,  was normal , next PAP 1/2023, request records.    · Colon cancer: Colonoscopy in not indicated.    Comments:  Patient up-to-date on cancer screening.    Metabolic history:    · coronary artery disease and stroke: lipid profile is abnormal, ASCVD cannot be calculated because of age, advised patient to lose weight, lifestyle modification, place referral to [facility] health improvement program.  · Patient is not hypertensive .     Comments:   Patient need to work on weight loss, she has dyslipidemia and obesity, BMI 32, placed referral for weight management program.  Patient noticed to be tachycardiac during clinic visits, heart watches showing heart rate of 75 at home, possible  whitecoat, alt.TSH and free T4.  Ordered vitamin D level.      lifestyle and functioning:   · Smoking: patient is non smoker .  · Physical activity:patient exercised [id number] a week   · Alcohol use:8-10 drinks , intervention Advised the patient to try to decrease his alcohol consumption.  · Obesity: patient BMI is Body mass index is 32.77 kg/m²., intervention referral to     Comments:  Patient will need to work on weight loss and decreasing alcohol consumption, patient was counseled and educated.    Infections:    · HCV:low risk no screening recommended        · HBV:  no history of immunization, screening recommended      · HIV:  patient was screened before, screening not recommended     · Syphilis, Chlamydia and Gonorrhea:  no high risk behavior, screening is not recommended              Comments:            No high risk behavior to screen for hepatitis C and sexually transmitted diseases, was already screened for HIV, will screen for hepatitis B virus surface antibody quant.    Behavioral:    · Depression: PHQ2 score 0, no intervention needed                        Comments:           No intervention needed    Immunization:   · PNA:PPSV23 not indicated     · influenza vaccination: Last dose received in 11/2018,   recommended for next season   · DTaP: Last dose received in 2012,     · Zoster vaccine: Not indicated.   · HBV:  no history of immunization, screening recommended       · HAV:  patient was immunized     · MMR: documented immunization/infection, MMR not recommended patient sure she had the vaccine              Comments:           Patient will need to screen for hepatitis B and a vaccines, might consider Twinrix.       Signed by: Berlin Morales M.D.

## 2019-06-27 NOTE — LETTER
[facility]  Padna Cr M.D.  [address] [facility] [address]  [place] [address]  Fax: [phone]   Authorization for Release/Disclosure of   Protected Health Information   Name: TOMA WILSON : 1985 SSN: [id number]   Address: [address] [place] [address]  [place] [address] Phone:    [phone] (home)    I authorize the entity listed below to release/disclose the PHI below to:   [facility]/Panda Cr M.D. and Berlin Morales M.D.   Provider or Entity Name:     Address   City, State, Zip   Phone:      Fax:     Reason for request: continuity of care   Information to be released:    [  ] LAST COLONOSCOPY,  including any PATH REPORT and follow-up  [  ] LAST FIT/COLOGUARD RESULT [  ] LAST DEXA  [  ] LAST MAMMOGRAM  [  ] LAST PAP  [  ] LAST LABS [  ] RETINA EXAM REPORT  [  ] IMMUNIZATION RECORDS  [  ] Release all info      [  ] Check here and initial the line next to each item to release ALL health information INCLUDING  _____ Care and treatment for drug and / or alcohol abuse  _____ HIV testing, infection status, or AIDS  _____ Genetic Testing    DATES OF SERVICE OR TIME PERIOD TO BE DISCLOSED: _____________  I understand and acknowledge that:  * This Authorization may be revoked at any time by you in writing, except if your health information has already been used or disclosed.  * Your health information that will be used or disclosed as a result of you signing this authorization could be re-disclosed by the recipient. If this occurs, your re-disclosed health information may no longer be protected by State or Federal laws.  * You may refuse to sign this Authorization. Your refusal will not affect your ability to obtain treatment.  * This Authorization becomes effective upon signing and will  on (date) __________.      If no date is indicated, this Authorization will  one (1) year from the signature date.    Name: Toma Wilson    Signature:   Date:     2019       PLEASE  FAX REQUESTED RECORDS BACK TO: [phone]

## 2019-06-27 NOTE — OP THERAPY DAILY TREATMENT
Outpatient Physical Therapy  DAILY TREATMENT     [facility] [address]  [address]  [place] [address]  Phone:  [phone]  Fax:  [phone]    Date: 06/27/2019    Patient: Christiane Wilson  YOB: 1985  MRN: [id number]     Time Calculation  Start time: 1605  Stop time: 1633 Time Calculation (min): 28 minutes     Chief Complaint: Shoulder Problem    Visit #: 6    SUBJECTIVE:  Shoulder feels pretty good today.  PT has been a good motivator for me to be more physically active and to move more.  My shirts fit better.  Had some tingling in R forearm the other night when I was getting ready for bed.  Being conscious of posture when sitting at desk.  I do have symptoms if I'm slouching for long period at the desk.  I take ibuprofen only when I need to, it's about once every other day.    OBJECTIVE:  Current objective measures: L shoulder AROM ER= 70.  R shoulder AROM ER= 60.        Therapeutic Exercises (CPT 39515):     1. Supine alt R/L UE flexion on FR, x10    2. Supine B shoulder horiz abd/add on FR, x10    3. Supine scap retraction on FR, x15    4. Shoulder horiz abd at 90 deg flex with light resistance band, x15    5. Corner pec stretch, 2x30 sec    6. Ball circles on wall, x10 CW/CCW at shoulder height small circles and big circles    7. Supine OH flexion with 5# wand, x15    8. Prone B shoulder flex over ball with 2# weights, x15    9. Prone B shoulder horiz abd over ball with 2# weights, x15    10. Prone B rows over ball with 2# weights, x15    11. Incline push ups, x15    12. D2 with light resistance band, x10B    Therapeutic Treatments and Modalities:     1. Manual Therapy (CPT 93064), R shoulder    Therapeutic Treatment and Modalities Summary: R GHJ inf, posterior, and distraction mobs Gr II-III with PROM.  R scap glides.      Time-based treatments/modalities:  Manual therapy minutes (CPT 45744): 5 minutes  Therapeutic exercise minutes (CPT 91317): 23  minutes       ASSESSMENT:   Response to treatment: Improved posture and scapulothoracic strength.    PLAN/RECOMMENDATIONS:   Plan for treatment: therapy treatment to continue next visit.  Planned interventions for next visit: continue with current treatment.

## 2019-06-28 ENCOUNTER — HOSPITAL ENCOUNTER (OUTPATIENT)
Dept: LAB | Facility: MEDICAL CENTER | Age: 34
End: 2019-06-28
Attending: STUDENT IN AN ORGANIZED HEALTH CARE EDUCATION/TRAINING PROGRAM
Payer: COMMERCIAL

## 2019-06-28 DIAGNOSIS — Z00.00 HEALTH MAINTENANCE EXAMINATION: ICD-10-CM

## 2019-06-28 LAB — 25(OH)D3 SERPL-MCNC: 37 NG/ML (ref 30–100)

## 2019-06-28 PROCEDURE — 82306 VITAMIN D 25 HYDROXY: CPT

## 2019-06-28 PROCEDURE — 86708 HEPATITIS A ANTIBODY: CPT

## 2019-06-28 PROCEDURE — 36415 COLL VENOUS BLD VENIPUNCTURE: CPT

## 2019-06-30 LAB — HAV AB SER QL IA: NEGATIVE

## 2019-07-12 ENCOUNTER — APPOINTMENT (OUTPATIENT)
Dept: PHYSICAL THERAPY | Facility: REHABILITATION | Age: 34
End: 2019-07-12
Attending: STUDENT IN AN ORGANIZED HEALTH CARE EDUCATION/TRAINING PROGRAM
Payer: COMMERCIAL

## 2019-07-19 ENCOUNTER — PHYSICAL THERAPY (OUTPATIENT)
Dept: PHYSICAL THERAPY | Facility: REHABILITATION | Age: 34
End: 2019-07-19
Attending: STUDENT IN AN ORGANIZED HEALTH CARE EDUCATION/TRAINING PROGRAM
Payer: COMMERCIAL

## 2019-07-19 DIAGNOSIS — M75.81 TENDINITIS OF RIGHT ROTATOR CUFF: ICD-10-CM

## 2019-07-19 PROCEDURE — 97140 MANUAL THERAPY 1/> REGIONS: CPT

## 2019-07-19 PROCEDURE — 97014 ELECTRIC STIMULATION THERAPY: CPT

## 2019-07-19 PROCEDURE — 97110 THERAPEUTIC EXERCISES: CPT

## 2019-07-19 NOTE — OP THERAPY DAILY TREATMENT
"  Outpatient Physical Therapy  DAILY TREATMENT     [facility] [address].  [address]  [place] [address]  Phone:  [phone]  Fax:  [phone]    Date: 07/19/2019    Patient: Christiane Wilson  YOB: 1985  MRN: [id number]     Time Calculation  Start time: 1333  Stop time: 1420 Time Calculation (min): 47 minutes     Chief Complaint: Shoulder Problem    Visit #: 7    SUBJECTIVE:  Sunday, moving heavy items, painful since then.  Hasn't done as much exercise as she should have.  Her goal was to be able to do \"regular push up\" by this PT visit, but due to recent exacerbation, doesn't feel like she can do it.  States she does incline pushups at bathroom counter when she can.    OBJECTIVE:  Current objective measures: R shoulder flexion, abd, and ER grossly equal or near equal to L.  Pain reported at endranges.        Therapeutic Exercises (CPT 42901):     1. Supine 1/2 FR serratus punch with 2#, x15    2. Supine B shoulder horiz abd/add on FR with 2#, x15    3. Supine scap retraction on 1/2 FR, x15    4. Shoulder horiz abd with med-light resistance band, x15    5. 1/2 FR snow angels, x15    6. Ball circles on wall, x15 CW/CCW B    7. Supine OH flexion with 3# wand, x15    8. Corner pec stretch, x30 sec    9. Corner pec stretch wall slide, x10    13. Sidelying ER with 2#, x15R    Therapeutic Treatments and Modalities:     1. Manual Therapy (CPT 91742), R shoulder    2. E Stim Unattended (CPT 98768), IFC and MH to superior R shoulder and scapular area, in supine, x15 min    Therapeutic Treatment and Modalities Summary: R GHJ inf, posterior, and distraction mobs Gr II-III with PROM.  R scap glides.      Time-based treatments/modalities:  Manual therapy minutes (CPT 28230): 12 minutes  Therapeutic exercise minutes (CPT 87065): 17 minutes       ASSESSMENT:   Response to treatment: Mostly improved symptoms, recently exacerbated by lifting injury.    PLAN/RECOMMENDATIONS:   Plan " for treatment: therapy treatment to continue next visit.  Planned interventions for next visit: continue with current treatment.  Use Bodyblade.

## 2019-08-13 ENCOUNTER — OFFICE VISIT (OUTPATIENT)
Dept: HEALTH INFORMATION MANAGEMENT | Facility: MEDICAL CENTER | Age: 34
End: 2019-08-13
Payer: COMMERCIAL

## 2019-08-13 VITALS
DIASTOLIC BLOOD PRESSURE: 86 MMHG | SYSTOLIC BLOOD PRESSURE: 122 MMHG | WEIGHT: 197 LBS | HEIGHT: 66 IN | OXYGEN SATURATION: 98 % | HEART RATE: 95 BPM | BODY MASS INDEX: 31.66 KG/M2

## 2019-08-13 DIAGNOSIS — E66.9 OBESITY (BMI 30-39.9): ICD-10-CM

## 2019-08-13 DIAGNOSIS — E78.00 HYPERCHOLESTEROLEMIA: ICD-10-CM

## 2019-08-13 DIAGNOSIS — R53.82 CHRONIC FATIGUE: ICD-10-CM

## 2019-08-13 DIAGNOSIS — R63.5 WEIGHT GAIN: ICD-10-CM

## 2019-08-13 DIAGNOSIS — Z82.49 FAMILY HISTORY OF PREMATURE CORONARY ARTERY DISEASE: ICD-10-CM

## 2019-08-13 DIAGNOSIS — G47.9 SLEEPING DIFFICULTY: ICD-10-CM

## 2019-08-13 PROCEDURE — 93000 ELECTROCARDIOGRAM COMPLETE: CPT | Performed by: INTERNAL MEDICINE

## 2019-08-13 PROCEDURE — 99205 OFFICE O/P NEW HI 60 MIN: CPT | Performed by: INTERNAL MEDICINE

## 2019-08-13 PROCEDURE — 97802 MEDICAL NUTRITION INDIV IN: CPT | Performed by: DIETITIAN, REGISTERED

## 2019-08-13 NOTE — PROGRESS NOTES
"8/13/2019     Christiane Wilson 34 y.o.        Time in/out: 3:45-4:06    Anthropometrics/Objective  Vitals:    08/13/19 1457   BP: 122/86   Weight: 89.4 kg (197 lb)   Height: 1.676 m (5' 6\")     BMI: Body mass index is 31.8 kg/m².  Stated Goal Weight: none, she just wants to be healthier and doesn't put too much emphasis on the number on the scale    See comprehensive patient history form for further information     Subjective:  Pt is here today for the initial screening visit for the medical weight management program.      - Christiane wants to lose weight to improve energy and health   - Partner is a good cook and cooks in the AM, so breakfast isn't an issue   - Her and her partner like to sample beers and admits she would like to cut back on her beer intake   - Feels tracking might be overwhelming, but likely because she will actually see what her food and beverage choices are adding up to, which she admits might actually be a good thing   - Going out of town this month    See HIP Medical Questionnaire in media for more detailed diet history     B - eggs, sausage or overnight oats with lots of fixings  L - sandwich  D - vegetables, meat  S - carrots, string cheese, fruit  Drinks: 2 glass beer per night    Nutrition Diagnosis (PES Statement)  · Obesity related to excessive energy intake and inadequate energy expenditure as evidenced by BMI >30     Client history:  Condition(s) associated with a diagnosis or treatment (specify) alcohol abuse, sleeping difficulty, hypercholesterolemia, chronic fatigue    Biochemical data, medical test and procedures  No results found for: HBA1C  No results found for: POCGLUCOSE  Lab Results   Component Value Date/Time    CHOLSTRLTOT 213 (H) 06/07/2019 07:30 AM     (H) 06/07/2019 07:30 AM    HDL 69 06/07/2019 07:30 AM    TRIGLYCERIDE 96 06/07/2019 07:30 AM         Nutrition Intervention  Nutrition Prescription  Recommended Daily Kcals: 3914-7900 Kcal based on REE of " 1572   Recommended Daily Protein: 100g or 30% or more per day per Dr. Palm   Recommended Daily CHO: 100g or 30% less per day per Dr. Palm     Meal Plan Recommendation   Low calorie, high protein/low CHO diet with 0 meal replacements per day per Dr. Palm     Comprehensive Nutrition Education Instruction or training leading to in-depth nutrition related knowledge about:   Benefits to following meal plan, Combine carb, protein and fat at each meal, Meal timing and spacing, Portion control, Sweets and alcohol in moderation, and tracking and situational use of MR   Handouts provided regarding topics discussed:   - LCD Plan   - MyPlate   - Snack list w/ fruit   - Meal ideas   - MyFitnessPal How-To and instructions on adding recipes    Monitoring & Evaluation Plan    Behavioral-Environmental:  Behavior: Keep a food journal and bring to next appointment   Physical activity: Did not discuss today     Food / Nutrient Intake:  Food intake: Follow meal plan as discussed. Avoid concentrated sweets and processed carbs. Use the plate method for portion control and macronutrient balance. Limit carbs/starch to up to 1/2 cup per meal. Snacks should be 1oz protein + ns veggies or fruit. Fruit once per day.     Fluid intake: Consume at least 64 oz water per day. Avoid all sweetened beverages. Limit coffee to 1 cup a day (ideally no cream or sugar). Avoid alcohol.      Physical Signs / Symptoms:  Weight loss towards BMI < 30   Lipid profiles WNL    Assessment Notes:  Today I oriented Alecia to Dr. Palm's Medical Weight Management program. Discussed higher protein, lower CHO and calorie controlled meal plan, optional but encouraged use of meal replacements, 3 meals and 2 snacks per day as well as calorie/macro tracking. She will not be using meal replacements at this time. We discussed how to build protein into each meal and snack, incorporating 1/2 plate non-starchy vegetable twice daily, fruit 1 x day, optional  1/2 cup of complex CHO at meals if desired, avoiding trigger foods and snacks consisting of protein and optional non-starchy vegetable or serving of fruit.     She will be tracking using [employer] and aiming for between 7883-6035 kcal/d.  She will just be focusing on calories at this time due to stating she was apprehensive that tracking would be overwhelming and I would like to minimize this. I did explain that we can use tracking in the future to assess her CHO to protein intake and in the meantime working towards just generally increasing her protein and decreasing her CHO.    F/U: 4-6 weeks with MD and JOHN

## 2019-08-13 NOTE — PROGRESS NOTES
"Bariatric Medicine H&P  Chief Complaint   Patient presents with   • Weight Gain       Referred by:  Berlin Morales,*    History of Present Illness:   Christiane Wilson is a 34 y.o.  female who presents for weight management and to help address co-morbidities caused by overweight, as below.    The patient would like to improve her general health and energy.  She feels very low on energy, is afraid it will get worse as she gets older.  She has not been part of a formal weight loss program in the past, has not been on weight loss medication.  She does not keep a food diary.  She is not sure what works best for her for weight loss.    Brief Diet History (see also RD notes):  AM: Eggs, sausage or oats  Lunch: [place]  Dinner: Vegetables, meat  Snacks: Carrots, fruit  Drinks: Water  She admits to drinking excess alcohol, at least 2 glasses of beer per night, with her partner.  Often goes out and snacks during this time as well.  She realizes she needs to cut back.    HLD: Not on statin or fenofibrate  Insomnia: Chronic history of insomnia, has not been evaluated in the past.  Wakes up very fatigued.    Behavior-Related History:  Binge eating screen: Negative  H/o abuse: Positive as a child, has not had therapy, defers     Exercise:   Walking 2-3 times per week     Review of Systems   Positive for fatigue, lack of energy, dry skin, difficulty breathing, headaches  Sleep apnea screen: Positive, has not had sleep eval  All other ROS were reviewed with patient today and are negative.      PMH/PSH:  I have reviewed the patient's medical, social and family history, allergies, and medications today.  Prior records reviewed.  Personal Hx of Bariatric Surgery: Negative      Physical Exam:   /86 (BP Location: Left arm, Patient Position: Sitting, BP Cuff Size: Large adult)   Pulse 95   Ht 1.676 m (5' 6\")   Wt 89.4 kg (197 lb)   SpO2 98%   BMI 31.80 kg/m²   Waist Measurement   Waist: 41 " inch/inches  Body fat % 43  REE 1572 kcal/day    Constitutional: Oriented to person, place, and time and well-developed, well-nourished, and in no distress.    HENT: No facial plethora.  No Cushingoid features.  No scalloped tongue.  No dental erosions.  No swollen parotids.  Head: Normocephalic.   Eyes: EOM are normal. Pupils are equal, round, and reactive to light. No periorbital edema.  No lateral thinning of eyebrows.  No vertical nystagmus.  Neck: Normal range of motion. Neck supple. No thyromegaly present. No buffalo hump.  Cardiovascular: Normal rate and regular rhythm.  No murmur heard.  Pulmonary/Chest: Effort normal and breath sounds normal. No wheezes.   Abdominal: Soft. Bowel sounds are normal. No pannus.  No ascites.  No hepatosplenomegaly.   Musculoskeletal: Normal range of motion. No edema.   Neurological: Alert and oriented to person, place, and time. Normal reflexes. No cranial nerve deficit. No muscle weakness.  Gait normal.   Skin: Warm and dry. Not diaphoretic. No hirsuitism.  No acanthosis nigricans.  Not excessively dry, scaly.  No acne.  No bruising/ecchymosis.  No hyperpigmentation.  No xanthomas or acrochordon.    Psychiatric: Mood, memory, affect and judgment normal.     Laboratory:   Prior labs reviewed.  EKG: Sinus rhythm, rate 80, no ST-T changes.  Corrected QT 0.436  Ordered and reviewed by me today.    Dietitian Assessment: I have reviewed the Dietitian's assessment related to this encounter.       ASSESSMENT/PLAN:  Body mass index is 31.8 kg/m².   Obesity Stage ([place]) 1; Class 1    1. Weight gain     2. Sleeping difficulty     3. Hypercholesterolemia     4. Obesity (BMI 30-39.9)     5. Chronic fatigue       I suspect the patient's weight gain, sleep difficulties and chronic fatigue are likely related to alcohol abuse.  Patient wishes to cut down alcohol intake, may benefit from tracking her intake to see current total kcal and refined carbohydrate intake as well.  Sleep study  referral given.  Monitor lipids, which should improve with weight loss and CHO reduction.  Labs have been unrevealing for other causes of fatigue.  Will work on increasing patient's activity level as well.  Consider anti-obesity medication pending course, such as Contrave.    The patient and I have discussed at length and agree to the following recommendations, which are all addressing the above diagnoses:    Weight Goal: 5% wt loss at one month after start (pt goal weight is 160 lb)  Diet:   Reduce beer intake by 50% to start  MR: None, discuss at subsequent visit  High Protein/Low Carb Meals and 2 snacks between meals daily  >100 g protein, <100 g total carbs daily, around 1400 kcal per day  64+ oz water per day  Avoid snacking, excess beer evenings  Track daily intake with My Fitness Pal, bring to next visit  Physical Activity: Increase walking, set goals at subsequent visit  Risk level for moderate/vigorous exercise program:   Low  New Rx:   Consider Contrave pending course but patient defers  Would not use phentermine in the setting of alcohol abuse  Behavior change:   Start tracking, gradually increase activity level  Strongly consider behavioral health counseling  Follow-up: one month with MD, 2 wks with RD    Face to face time spent 60 minutes,  with >50% of time devoted to one on one counseling on weight management issues, as documented above.      Patient's body mass index is 31.8 kg/m². Exercise and nutrition counseling were performed at this visit.        Thank you for your referral!

## 2019-08-15 ENCOUNTER — TELEPHONE (OUTPATIENT)
Dept: PHYSICAL THERAPY | Facility: REHABILITATION | Age: 34
End: 2019-08-15

## 2019-08-15 NOTE — OP THERAPY DISCHARGE SUMMARY
Outpatient Physical Therapy  DISCHARGE SUMMARY NOTE      [facility]  [address]  [address]  [place] [address]  Phone:  [phone]  Fax:  [phone]    Date of Visit: 08/15/2019    Patient: Christiane Wilson  YOB: 1985  MRN: [id number]     Referring Provider: Berlin Morales M.D.  [address]  [facility] [address]  [place] [address]   Referring Diagnosis Tendinitis of right rotator cuff [M75.81]     Physical Therapy Occurrence Codes    Date of onset of impairment:  6/7/09   Date physical therapy care plan established or reviewed:  6/7/19   Date physical therapy treatment started:  6/7/19              Your patient is being discharged from Physical Therapy with the following comments:   · Goals partially met      Recommendations:  Continue HEP.    Dona Valentino, PT    Date: 8/15/2019

## 2019-11-04 ENCOUNTER — IMMUNIZATION (OUTPATIENT)
Dept: SOCIAL WORK | Facility: CLINIC | Age: 34
End: 2019-11-04
Payer: COMMERCIAL

## 2019-11-04 DIAGNOSIS — Z23 NEED FOR VACCINATION: ICD-10-CM

## 2019-11-04 PROCEDURE — 90471 IMMUNIZATION ADMIN: CPT | Performed by: REGISTERED NURSE

## 2019-11-04 PROCEDURE — 90686 IIV4 VACC NO PRSV 0.5 ML IM: CPT | Performed by: REGISTERED NURSE

## 2020-10-13 NOTE — PATIENT INSTRUCTIONS
- please do the lab work   - please follow up referral to [facility] Improvement program.   Health Maintenance, Female  Introduction  Adopting a healthy lifestyle and getting preventive care can go a long way to promote health and wellness. Talk with your health care provider about what schedule of regular examinations is right for you. This is a good chance for you to check in with your provider about disease prevention and staying healthy.  In between checkups, there are plenty of things you can do on your own. Experts have done a lot of research about which lifestyle changes and preventive measures are most likely to keep you healthy. Ask your health care provider for more information.  Weight and diet  Eat a healthy diet  · Be sure to include plenty of vegetables, fruits, low-fat dairy products, and lean protein.  · Do not eat a lot of foods high in solid fats, added sugars, or salt.  · Get regular exercise. This is one of the most important things you can do for your health.  ¨ Most adults should exercise for at least 150 minutes each week. The exercise should increase your heart rate and make you sweat (moderate-intensity exercise).  ¨ Most adults should also do strengthening exercises at least twice a week. This is in addition to the moderate-intensity exercise.  Maintain a healthy weight  · Body mass index (BMI) is a measurement that can be used to identify possible weight problems. It estimates body fat based on height and weight. Your health care provider can help determine your BMI and help you achieve or maintain a healthy weight.  · For females 20 years of age and older:  ¨ A BMI below 18.5 is considered underweight.  ¨ A BMI of 18.5 to 24.9 is normal.  ¨ A BMI of 25 to 29.9 is considered overweight.  ¨ A BMI of 30 and above is considered obese.  Watch levels of cholesterol and blood lipids  · You should start having your blood tested for lipids and cholesterol at 20 years of age, then have this test  Prenatal Visit: here with the father of the baby for transfer of care.   Her FOB conveys that his oldest son has sickle cell trait. He has never been tested himself but he is certain that he did not get it from his ex.   Hansa is transferring care to deliver at [facility]. She had  contractions in her last pregnancy but a full term delivery. She has an 8 year old daughter. She denies any complications with that pregnancy. Her initial labs at [facility] were  Significant for rubella non-Immune and positive THC. Her nausea has completely resolved. She is open to an electrophoresis today. Discussed risk of HgbSS if she is positive.  Plan on anatomical survey next visit  Undecided about aneuploidy screening at this time. Will send AFP at the next visit.     RTC in 4 weeks  Rayna Noel MD           every 5 years.  · You may need to have your cholesterol levels checked more often if:  ¨ Your lipid or cholesterol levels are high.  ¨ You are older than 50 years of age.  ¨ You are at high risk for heart disease.  Cancer screening  Lung Cancer  · Lung cancer screening is recommended for adults 55-80 years old who are at high risk for lung cancer because of a history of smoking.  · A yearly low-dose CT scan of the lungs is recommended for people who:  ¨ Currently smoke.  ¨ Have quit within the past 15 years.  ¨ Have at least a 30-pack-year history of smoking. A pack year is smoking an average of one pack of cigarettes a day for 1 year.  · Yearly screening should continue until it has been 15 years since you quit.  · Yearly screening should stop if you develop a health problem that would prevent you from having lung cancer treatment.  Breast Cancer  · Practice breast self-awareness. This means understanding how your breasts normally appear and feel.  · It also means doing regular breast self-exams. Let your health care provider know about any changes, no matter how small.  · If you are in your 20s or 30s, you should have a clinical breast exam (CBE) by a health care provider every 1-3 years as part of a regular health exam.  · If you are 40 or older, have a CBE every year. Also consider having a breast X-ray (mammogram) every year.  · If you have a family history of breast cancer, talk to your health care provider about genetic screening.  · If you are at high risk for breast cancer, talk to your health care provider about having an MRI and a mammogram every year.  · Breast cancer gene (BRCA) assessment is recommended for women who have family members with BRCA-related cancers. BRCA-related cancers include:  ¨ Breast.  ¨ Ovarian.  ¨ Tubal.  ¨ Peritoneal cancers.  · Results of the assessment will determine the need for genetic counseling and BRCA1 and BRCA2 testing.  Cervical Cancer   Your health care provider may  recommend that you be screened regularly for cancer of the pelvic organs (ovaries, uterus, and vagina). This screening involves a pelvic examination, including checking for microscopic changes to the surface of your cervix (Pap test). You may be encouraged to have this screening done every 3 years, beginning at age 21.  · For women ages 30-65, health care providers may recommend pelvic exams and Pap testing every 3 years, or they may recommend the Pap and pelvic exam, combined with testing for human papilloma virus (HPV), every 5 years. Some types of HPV increase your risk of cervical cancer. Testing for HPV may also be done on women of any age with unclear Pap test results.  · Other health care providers may not recommend any screening for nonpregnant women who are considered low risk for pelvic cancer and who do not have symptoms. Ask your health care provider if a screening pelvic exam is right for you.  · If you have had past treatment for cervical cancer or a condition that could lead to cancer, you need Pap tests and screening for cancer for at least 20 years after your treatment. If Pap tests have been discontinued, your risk factors (such as having a new sexual partner) need to be reassessed to determine if screening should resume. Some women have medical problems that increase the chance of getting cervical cancer. In these cases, your health care provider may recommend more frequent screening and Pap tests.  Colorectal Cancer  · This type of cancer can be detected and often prevented.  · Routine colorectal cancer screening usually begins at 50 years of age and continues through 75 years of age.  · Your health care provider may recommend screening at an earlier age if you have risk factors for colon cancer.  · Your health care provider may also recommend using home test kits to check for hidden blood in the stool.  · A small camera at the end of a tube can be used to examine your colon directly  (sigmoidoscopy or colonoscopy). This is done to check for the earliest forms of colorectal cancer.  · Routine screening usually begins at age 50.  · Direct examination of the colon should be repeated every 5-10 years through 75 years of age. However, you may need to be screened more often if early forms of precancerous polyps or small growths are found.  Skin Cancer  · Check your skin from head to toe regularly.  · Tell your health care provider about any new moles or changes in moles, especially if there is a change in a mole's shape or color.  · Also tell your health care provider if you have a mole that is larger than the size of a pencil eraser.  · Always use sunscreen. Apply sunscreen liberally and repeatedly throughout the day.  · Protect yourself by wearing long sleeves, pants, a wide-brimmed hat, and sunglasses whenever you are outside.  Heart disease, diabetes, and high blood pressure  · High blood pressure causes heart disease and increases the risk of stroke. High blood pressure is more likely to develop in:  ¨ People who have blood pressure in the high end of the normal range (130-139/85-89 mm Hg).  ¨ People who are overweight or obese.  ¨ People who are .  · If you are 18-39 years of age, have your blood pressure checked every 3-5 years. If you are 40 years of age or older, have your blood pressure checked every year. You should have your blood pressure measured twice--once when you are at a hospital or clinic, and once when you are not at a hospital or clinic. Record the average of the two measurements. To check your blood pressure when you are not at a hospital or clinic, you can use:  ¨ An automated blood pressure machine at a pharmacy.  ¨ A home blood pressure monitor.  · If you are between 55 years and 79 years old, ask your health care provider if you should take aspirin to prevent strokes.  · Have regular diabetes screenings. This involves taking a blood sample to check your  fasting blood sugar level.  ¨ If you are at a normal weight and have a low risk for diabetes, have this test once every three years after 45 years of age.  ¨ If you are overweight and have a high risk for diabetes, consider being tested at a younger age or more often.  Preventing infection  Hepatitis B  · If you have a higher risk for hepatitis B, you should be screened for this virus. You are considered at high risk for hepatitis B if:  ¨ You were born in a country where hepatitis B is common. Ask your health care provider which countries are considered high risk.  ¨ Your parents were born in a high-risk country, and you have not been immunized against hepatitis B (hepatitis B vaccine).  ¨ You have HIV or AIDS.  ¨ You use needles to inject street drugs.  ¨ You live with someone who has hepatitis B.  ¨ You have had sex with someone who has hepatitis B.  ¨ You get hemodialysis treatment.  ¨ You take certain medicines for conditions, including cancer, organ transplantation, and autoimmune conditions.  Hepatitis C  · Blood testing is recommended for:  ¨ Everyone born from 1945 through 1965.  ¨ Anyone with known risk factors for hepatitis C.  Sexually transmitted infections (STIs)  · You should be screened for sexually transmitted infections (STIs) including gonorrhea and chlamydia if:  ¨ You are sexually active and are younger than 24 years of age.  ¨ You are older than 24 years of age and your health care provider tells you that you are at risk for this type of infection.  ¨ Your sexual activity has changed since you were last screened and you are at an increased risk for chlamydia or gonorrhea. Ask your health care provider if you are at risk.  · If you do not have HIV, but are at risk, it may be recommended that you take a prescription medicine daily to prevent HIV infection. This is called pre-exposure prophylaxis (PrEP). You are considered at risk if:  ¨ You are sexually active and do not regularly use condoms or  know the HIV status of your partner(s).  ¨ You take drugs by injection.  ¨ You are sexually active with a partner who has HIV.  Talk with your health care provider about whether you are at high risk of being infected with HIV. If you choose to begin PrEP, you should first be tested for HIV. You should then be tested every 3 months for as long as you are taking PrEP.  Pregnancy  · If you are premenopausal and you may become pregnant, ask your health care provider about preconception counseling.  · If you may become pregnant, take 400 to 800 micrograms (mcg) of folic acid every day.  · If you want to prevent pregnancy, talk to your health care provider about birth control (contraception).  Osteoporosis and menopause  · Osteoporosis is a disease in which the bones lose minerals and strength with aging. This can result in serious bone fractures. Your risk for osteoporosis can be identified using a bone density scan.  · If you are 65 years of age or older, or if you are at risk for osteoporosis and fractures, ask your health care provider if you should be screened.  · Ask your health care provider whether you should take a calcium or vitamin D supplement to lower your risk for osteoporosis.  · Menopause may have certain physical symptoms and risks.  · Hormone replacement therapy may reduce some of these symptoms and risks.  Talk to your health care provider about whether hormone replacement therapy is right for you.  Follow these instructions at home:  · Schedule regular health, dental, and eye exams.  · Stay current with your immunizations.  · Do not use any tobacco products including cigarettes, chewing tobacco, or electronic cigarettes.  · If you are pregnant, do not drink alcohol.  · If you are breastfeeding, limit how much and how often you drink alcohol.  · Limit alcohol intake to no more than 1 drink per day for nonpregnant women. One drink equals 12 ounces of beer, 5 ounces of wine, or 1½ ounces of hard  liquor.  · Do not use street drugs.  · Do not share needles.  · Ask your health care provider for help if you need support or information about quitting drugs.  · Tell your health care provider if you often feel depressed.  · Tell your health care provider if you have ever been abused or do not feel safe at home.  This information is not intended to replace advice given to you by your health care provider. Make sure you discuss any questions you have with your health care provider.  Document Released: 07/02/2012 Document Revised: 05/25/2017 Document Reviewed: 09/20/2016  © 2017 [employer]

## 2022-04-24 ENCOUNTER — OFFICE VISIT (OUTPATIENT)
Dept: URGENT CARE | Facility: CLINIC | Age: 37
End: 2022-04-24
Payer: COMMERCIAL

## 2022-04-24 VITALS
RESPIRATION RATE: 16 BRPM | WEIGHT: 189.3 LBS | DIASTOLIC BLOOD PRESSURE: 78 MMHG | OXYGEN SATURATION: 98 % | SYSTOLIC BLOOD PRESSURE: 112 MMHG | TEMPERATURE: 98.5 F | HEIGHT: 66 IN | HEART RATE: 79 BPM | BODY MASS INDEX: 30.42 KG/M2

## 2022-04-24 DIAGNOSIS — S09.90XA CLOSED HEAD INJURY, INITIAL ENCOUNTER: ICD-10-CM

## 2022-04-24 PROCEDURE — 99204 OFFICE O/P NEW MOD 45 MIN: CPT | Performed by: PHYSICIAN ASSISTANT

## 2022-04-24 ASSESSMENT — ENCOUNTER SYMPTOMS
SENSORY CHANGE: 0
LOSS OF CONSCIOUSNESS: 0
CHILLS: 0
WEAKNESS: 0
FOCAL WEAKNESS: 0
VOMITING: 0
FEVER: 0
DISORIENTATION: 0
MYALGIAS: 1
HEADACHES: 1
NAUSEA: 0
BLURRED VISION: 0
DOUBLE VISION: 0
MEMORY LOSS: 0
DIZZINESS: 0
SORE THROAT: 0
SPEECH CHANGE: 0
TINGLING: 0
NUMBNESS: 0
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
## 2022-04-24 NOTE — PATIENT INSTRUCTIONS
Head Injury, Adult  There are many types of head injuries. They can be as minor as a bump. Some head injuries can be worse. Worse injuries include:  · A strong hit to the head that shakes the brain back and forth causing damage (concussion).  · A bruise (contusion) of the brain. This means there is bleeding in the brain that can cause swelling.  · A cracked skull (skull fracture).  · Bleeding in the brain that gathers, gets thick (makes a clot), and forms a bump (hematoma).  Most problems from a head injury come in the first 24 hours. However, you may still have side effects up to 7-10 days after your injury. It is important to watch your condition for any changes. You may need to be watched in the emergency department or urgent care, or you may need to stay in the hospital.  What are the causes?  There are many possible causes of a head injury. A serious head injury may be caused by:  · A car accident.  · Bicycle or motorcycle accidents.  · Sports injuries.  · Falls.  What are the signs or symptoms?  Symptoms of a head injury include a bruise, bump, or bleeding where the injury happened. Other physical symptoms may include:  · Headache.  · Feeling sick to your stomach (nauseous) or vomiting.  · Dizziness.  · Feeling tired.  · Being uncomfortable around bright lights or loud noises.  · Shaking movements that you cannot control (seizures).  · Trouble being woken up.  · Passing out (fainting).  Mental or emotional symptoms may include:  · Feeling grumpy or cranky.  · Confusion and memory problems.  · Having trouble paying attention or concentrating.  · Changes in eating or sleeping habits.  · Feeling worried or nervous (anxious).  · Feeling sad (depressed).  How is this treated?  Treatment for this condition depends on how severe the injury is and the type of injury you have. The main goal is to prevent complications and to allow the brain time to heal.  Mild head injury  If you have a mild head injury, you may be  sent home and treatment may include:  · Being watched. A responsible adult should stay with you for 24 hours after your injury and check on you often.  · Physical rest.  · Brain rest.  · Pain medicines.  Severe head injury  If you have a severe head injury, treatment may include:  · Being watched closely. This includes hospitalization with frequent physical exams.  · Medicines to:  ? Help with pain.  ? Prevent shaking movements that you cannot control.  ? Help with brain swelling.  · Using a machine that helps you breathe (ventilator).  · Treatments to manage the swelling inside the brain.  · Brain surgery. This may be needed to:  ? Remove a blood clot.  ? Stop the bleeding.  ? Remove a part of the skull. This allows room for the brain to swell.  Follow these instructions at home:  Activity  · Rest.  · Avoid activities that are hard or tiring.  · Make sure you get enough sleep.  · Limit activities that need a lot of thought or attention, such as:  ? Watching TV.  ? Playing memory games and puzzles.  ? Job-related work or homework.  ? Working on the computer, social media, and texting.  · Avoid activities that could cause another head injury until your doctor says it is okay. This includes playing sports. Having another head injury, especially before the first one has healed, can be dangerous.  · Ask your doctor when it is safe for you to go back to your normal activities, such as work or school. Ask your doctor for a step-by-step plan for slowly going back to your normal activities.  · Ask your doctor when you can drive, ride a bicycle, or use heavy machinery. Do not do these activities if you are dizzy.  Lifestyle    · Do not drink alcohol until your doctor says it is okay.  · Do not use drugs.  · If it is harder than usual to remember things, write them down.  · If you are easily distracted, try to do one thing at a time.  · Talk with family members or close friends when making important decisions.  · Tell your  friends, family, a trusted coworker, and  about your injury, symptoms, and limits (restrictions). Have them watch for any problems that are new or getting worse.  General instructions  · Take over-the-counter and prescription medicines only as told by your doctor.  · Have someone stay with you for 24 hours after your head injury. This person should watch you for any changes in your symptoms and be ready to get help.  · Keep all follow-up visits as told by your doctor. This is important.  How is this prevented?  · Work on your balance and strength. This can help you avoid falls.  · Wear a seatbelt when you are in a moving vehicle.  · Wear a helmet when you:  ? Ride a bicycle.  ? Ski.  ? Do any other sport or activity that has a risk of injury.  · If you drink alcohol:  ? Limit how much you use to:  ? 0-1 drink a day for women.  ? 0-2 drinks a day for men.  ? Be aware of how much alcohol is in your drink. In the [place] one drink equals one 12 oz bottle of beer (355 mL), one 5 oz glass of wine (148 mL), or one 1½ oz glass of hard liquor (44 mL).  · Make your home safer by:  ? Getting rid of clutter from the floors and stairs. This includes things that can make you trip.  ? Using grab bars in bathrooms and handrails by stairs.  ? Placing non-slip mats on floors and in bathtubs.  ? Putting more light in dim areas.  Get help right away if:  · You have:  ? A very bad headache that is not helped by medicine.  ? Trouble walking or weakness in your arms and legs.  ? Clear or bloody fluid coming from your nose or ears.  ? Changes in how you see (vision).  ? Shaking movements that you cannot control.  · You lose your balance.  · You vomit.  · The black centers of your eyes (pupils) change in size.  · Your speech is slurred.  · Your dizziness gets worse.  · You pass out.  · You are sleepier than normal and have trouble staying awake.  · Your symptoms get worse.  These symptoms may be an emergency. Do not wait to see  if the symptoms will go away. Get medical help right away. Call your local emergency services (911 in the [place] Do not drive yourself to the hospital.  Summary  · There are many types of head injuries. They can be as minor as a bump. Some head injuries can be worse  · Treatment for this condition depends on how severe the injury is and the type of injury you have.  · Ask your doctor when it is safe for you to go back to your normal activities, such as work or school.  · To prevent a head injury, wear a seat belt in a car, wear a helmet when you use a a bicycle, limit your alcohol use, and make your home safer.  This information is not intended to replace advice given to you by your health care provider. Make sure you discuss any questions you have with your health care provider.  Document Released: 11/30/2009 Document Revised: 04/09/2020 Document Reviewed: 01/10/2020  Elsevier Patient Education © 2020 [employer]

## 2022-09-30 ENCOUNTER — APPOINTMENT (OUTPATIENT)
Dept: RADIOLOGY | Facility: IMAGING CENTER | Age: 37
End: 2022-09-30
Attending: PHYSICIAN ASSISTANT
Payer: COMMERCIAL

## 2022-09-30 ENCOUNTER — OFFICE VISIT (OUTPATIENT)
Dept: URGENT CARE | Facility: CLINIC | Age: 37
End: 2022-09-30
Payer: COMMERCIAL

## 2022-09-30 VITALS
DIASTOLIC BLOOD PRESSURE: 70 MMHG | BODY MASS INDEX: 30.51 KG/M2 | OXYGEN SATURATION: 97 % | HEIGHT: 66 IN | SYSTOLIC BLOOD PRESSURE: 102 MMHG | HEART RATE: 90 BPM | TEMPERATURE: 97.9 F | RESPIRATION RATE: 16 BRPM

## 2022-09-30 DIAGNOSIS — M79.604 RIGHT LEG PAIN: ICD-10-CM

## 2022-09-30 DIAGNOSIS — S70.11XA CONTUSION OF RIGHT THIGH, INITIAL ENCOUNTER: ICD-10-CM

## 2022-09-30 PROCEDURE — 73552 X-RAY EXAM OF FEMUR 2/>: CPT | Mod: TC,RT | Performed by: PHYSICIAN ASSISTANT

## 2022-09-30 PROCEDURE — 99214 OFFICE O/P EST MOD 30 MIN: CPT | Performed by: PHYSICIAN ASSISTANT

## 2022-09-30 RX ORDER — IBUPROFEN 200 MG
400 TABLET ORAL ONCE
Status: COMPLETED | OUTPATIENT
Start: 2022-09-30 | End: 2022-09-30

## 2022-09-30 RX ADMIN — Medication 400 MG: at 17:36

## 2022-10-01 ENCOUNTER — HOSPITAL ENCOUNTER (OUTPATIENT)
Dept: RADIOLOGY | Facility: MEDICAL CENTER | Age: 37
End: 2022-10-01
Attending: PHYSICIAN ASSISTANT
Payer: COMMERCIAL

## 2022-10-01 DIAGNOSIS — S70.11XA CONTUSION OF RIGHT THIGH, INITIAL ENCOUNTER: ICD-10-CM

## 2022-10-01 PROCEDURE — 76882 US LMTD JT/FCL EVL NVASC XTR: CPT | Mod: RT

## 2022-10-01 NOTE — PROGRESS NOTES
"  Subjective:   Christiane Wilson is a 37 y.o. female who presents today with   Chief Complaint   Patient presents with    Knee Injury     X1 hour ago (R) Quad had barbell fell on pt. Overhead with 75lbs. Feels least amount of pain when leg is extended out. Bruising. Ice has helped stop the pain     Knee Injury  This is a new problem. The current episode started today. The problem occurs constantly. The problem has been unchanged. The symptoms are aggravated by walking and bending. She has tried ice for the symptoms. The treatment provided mild relief.   Patient had a 75 pound barbell above her head and accidentally dropped it onto her right thigh.    PMH:  has a past medical history of Seasonal allergies.  MEDS:   Current Outpatient Medications:     ethinyl estradiol-etonogestrel (NUVARING) 0.12-0.015 MG/24HR vaginal ring, ETONOGESTREL-ETHINYL ESTRADIOL 0.12-0.015 MG/24HR RING (Patient not taking: Reported on 9/30/2022), Disp: , Rfl:   ALLERGIES:   Allergies   Allergen Reactions    Buckwheat Anaphylaxis     SURGHX: History reviewed. No pertinent surgical history.  SOCHX:  reports that she has quit smoking. She has a 1.25 pack-year smoking history. She uses smokeless tobacco. She reports current alcohol use of about 4.8 oz per week. She reports that she does not use drugs.  FH: Reviewed with patient, not pertinent to this visit.     Review of Systems   Musculoskeletal:         Right thigh pain      Objective:   /70 (BP Location: Left arm, Patient Position: Sitting, BP Cuff Size: Small adult)   Pulse 90   Temp 36.6 °C (97.9 °F) (Temporal)   Resp 16   Ht 1.676 m (5' 6\")   SpO2 97%   BMI 30.51 kg/m²   Physical Exam  Vitals and nursing note reviewed.   Constitutional:       General: She is not in acute distress.     Appearance: Normal appearance. She is well-developed. She is not ill-appearing or toxic-appearing.   HENT:      Head: Normocephalic and atraumatic.      Right Ear: Hearing normal.      Left Ear: " Hearing normal.   Eyes:      Pupils: Pupils are equal, round, and reactive to light.   Cardiovascular:      Rate and Rhythm: Normal rate and regular rhythm.      Heart sounds: Normal heart sounds.   Pulmonary:      Effort: Pulmonary effort is normal.      Breath sounds: Normal breath sounds.   Musculoskeletal:        Legs:       Comments: Patient has no bony tenderness to palpation of the right knee.  Does have some significant tenderness to palpation above the right knee and the quadriceps/thigh area.  Significant deficit and divot to the distal thigh just above the knee.  Swelling to the area but no ecchymosis at this point.  Neurovascular intact distally in the right lower extremity.  Antalgic gait and patient only is comfortable with walking at full extension.  Patient is able to flex and extend at the level of the right knee.   Skin:     General: Skin is warm and dry.   Neurological:      Mental Status: She is alert.      Coordination: Coordination normal.   Psychiatric:         Mood and Affect: Mood normal.       DX FEMUR  FINDINGS:  There is no fracture or dislocation.  The visualized osseous structures are in anatomic alignment.  There may be mild degenerative changes of the right hip.  Bone mineralization is age-appropriate..     IMPRESSION:     No acute osseous abnormality.    Assessment/Plan:   Assessment    1. Right leg pain  - DX-FEMUR-2+ RIGHT; Future  - ibuprofen (MOTRIN) tablet 400 mg    2. Contusion of right thigh, initial encounter  - US-EXTREMITY NON VASCULAR UNILATERAL RIGHT; Future  - Referral to Orthopedics  Discussed with patient I would recommend ultrasound of the area to rule out any possibility of tear of the muscle to the area and given her late presentation I discussed with her that the only way she would be able to get an ultrasound is in the emergency room tonight and she understands but would like to wait and have outpatient imaging scheduled and follow-up with orthopedic specialist.   Ace wrap was placed today for slight compression and also recommend continuing with ice and elevation.  I believe symptoms are more consistent with contusion with secondary hematoma developing to the area rather than tear of the muscle as she does still have range of motion to the area but it does cause pain.  Patient was given order for ultrasound and she will call to schedule tomorrow.  Patient can remove the Ace wrap for comfort and adjust accordingly.  Differential diagnosis, natural history, supportive care, and indications for immediate follow-up discussed.   Patient given instructions and understanding of medications and treatment.    If not improving in 3-5 days, F/U with PCP or return to  if symptoms worsen.    Patient agreeable to plan.    Please note that this dictation was created using voice recognition software. I have made every reasonable attempt to correct obvious errors, but I expect that there are errors of grammar and possibly content that I did not discover before finalizing the note.    Ren Falk PA-C

## 2023-04-25 ENCOUNTER — APPOINTMENT (RX ONLY)
Dept: URBAN - METROPOLITAN AREA CLINIC 31 | Facility: CLINIC | Age: 38
Setting detail: DERMATOLOGY
End: 2023-04-25

## 2023-04-25 DIAGNOSIS — D18.0 HEMANGIOMA: ICD-10-CM

## 2023-04-25 DIAGNOSIS — D22 MELANOCYTIC NEVI: ICD-10-CM

## 2023-04-25 DIAGNOSIS — Z71.89 OTHER SPECIFIED COUNSELING: ICD-10-CM

## 2023-04-25 DIAGNOSIS — L81.4 OTHER MELANIN HYPERPIGMENTATION: ICD-10-CM

## 2023-04-25 DIAGNOSIS — L28.1 PRURIGO NODULARIS: ICD-10-CM

## 2023-04-25 DIAGNOSIS — L82.1 OTHER SEBORRHEIC KERATOSIS: ICD-10-CM

## 2023-04-25 PROBLEM — D22.5 MELANOCYTIC NEVI OF TRUNK: Status: ACTIVE | Noted: 2023-04-25

## 2023-04-25 PROBLEM — D18.01 HEMANGIOMA OF SKIN AND SUBCUTANEOUS TISSUE: Status: ACTIVE | Noted: 2023-04-25

## 2023-04-25 PROCEDURE — ? COUNSELING

## 2023-04-25 PROCEDURE — 99396 PREV VISIT EST AGE 40-64: CPT

## 2023-04-25 ASSESSMENT — LOCATION DETAILED DESCRIPTION DERM
LOCATION DETAILED: RIGHT SUPERIOR UPPER BACK
LOCATION DETAILED: RIGHT LATERAL UPPER BACK
LOCATION DETAILED: INFERIOR THORACIC SPINE
LOCATION DETAILED: RIGHT INFERIOR UPPER BACK
LOCATION DETAILED: RIGHT ANTERIOR PROXIMAL UPPER ARM

## 2023-04-25 ASSESSMENT — LOCATION SIMPLE DESCRIPTION DERM
LOCATION SIMPLE: RIGHT UPPER ARM
LOCATION SIMPLE: RIGHT UPPER BACK
LOCATION SIMPLE: UPPER BACK

## 2023-04-25 ASSESSMENT — LOCATION ZONE DERM
LOCATION ZONE: TRUNK
LOCATION ZONE: ARM

## 2025-05-31 ENCOUNTER — OFFICE VISIT (OUTPATIENT)
Dept: URGENT CARE | Facility: CLINIC | Age: 40
End: 2025-05-31
Payer: COMMERCIAL

## 2025-05-31 VITALS
WEIGHT: 179 LBS | OXYGEN SATURATION: 98 % | HEIGHT: 66 IN | RESPIRATION RATE: 18 BRPM | DIASTOLIC BLOOD PRESSURE: 80 MMHG | TEMPERATURE: 97.8 F | SYSTOLIC BLOOD PRESSURE: 128 MMHG | BODY MASS INDEX: 28.77 KG/M2 | HEART RATE: 78 BPM
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
## 2025-05-31 ASSESSMENT — ENCOUNTER SYMPTOMS
ABDOMINAL PAIN: 0
SENSORY CHANGE: 0
CHILLS: 0
MYALGIAS: 0
SHORTNESS OF BREATH: 0
DIAPHORESIS: 0
DIARRHEA: 0
VOMITING: 0
FEVER: 0
TINGLING: 0
WEAKNESS: 0
HEADACHES: 0
NAUSEA: 0
FOCAL WEAKNESS: 0
DIZZINESS: 0

## 2025-05-31 NOTE — PROGRESS NOTES
"Subjective:   Christiane Wilson is a 40 y.o. female who presents for Animal Bite (DOGS FIGHTING LAST NIGHT)      HPI  Patient was breaking up a dog fight last night, it was her dogs. Sustained bites and puncture wounds to left hand.   Dog up to date on vaccines: yes  Diabetic: No  Tetanus: 2021  Cleansed wound: yes with soap and water at home        Review of Systems   Constitutional:  Negative for chills, diaphoresis, fever and malaise/fatigue.   Respiratory:  Negative for shortness of breath.    Gastrointestinal:  Negative for abdominal pain, diarrhea, nausea and vomiting.   Musculoskeletal:  Negative for myalgias.   Skin:  Negative for rash.        Puncture wound left hand   Neurological:  Negative for dizziness, tingling, sensory change, focal weakness, weakness and headaches.   All other systems reviewed and are negative.      Medical History:  Past Medical History[1]    Allergies:  Allergies[2]    Social history, surgical history, medications, and current problem list reviewed today in Epic.       Objective:     /80 (BP Location: Right arm, Patient Position: Sitting, BP Cuff Size: Adult)   Pulse 78   Temp 36.6 °C (97.8 °F) (Temporal)   Resp 18   Ht 1.676 m (5' 6\")   Wt 81.2 kg (179 lb)   SpO2 98%     Physical Exam  Vitals reviewed.   Constitutional:       General: She is not in acute distress.     Appearance: Normal appearance. She is not ill-appearing, toxic-appearing or diaphoretic.   Cardiovascular:      Rate and Rhythm: Normal rate.      Pulses: Normal pulses.   Pulmonary:      Effort: Pulmonary effort is normal.   Musculoskeletal:         General: Normal range of motion.      Cervical back: Normal range of motion.   Skin:     General: Skin is warm.      Capillary Refill: Capillary refill takes less than 2 seconds.      Comments: Multiple puncture wound to left hand.  Mild swelling.  No drainage.  No fluctuance.  No concern for infection at this time.   Neurological:      General: No focal " deficit present.      Mental Status: She is alert and oriented to person, place, and time.   Psychiatric:         Mood and Affect: Mood normal.         Behavior: Behavior normal.         Assessment/Plan:       Diagnosis and associated orders:     1. Dog bite, initial encounter  amoxicillin-clavulanate (AUGMENTIN) 875-125 MG Tab           Comments/MDM:   I personally reviewed prior external notes and test results pertinent to today's visit as well as additional imaging and testing completed in clinic today.     Very pleasant 40-year-old female presenting with complaints of dog bite to left hand. Multiple puncture wound to left hand.  Mild swelling.  No drainage.  No fluctuance.  No concern for infection at this time.  Patient is up-to-date on tetanus vaccine.  With inflammation and hand involvement, patient started on Augmentin twice daily for 5 days for prophylaxis.  No concern for neurovascular compromise.  Patient given strict instructions to follow-up with the clinic or emergency room if red flag warnings develop.  Patient is clinically stable at today's acute urgent care visit. Vital signs are normal and reassuring.  No acute distress noted. Appropriate for outpatient management at this time. No red flag warnings noted.  Patient given strict instructions to follow up with emergency room if they develop any red flag warnings which were discussed in depth.  They verbalized understanding. Differential diagnosis, natural history, supportive care, and indications for immediate follow-up discussed. All questions answered. They agree with the plan of care.      Please note that this dictation was created using voice recognition software. I have made every reasonable attempt to correct obvious errors, but I expect that there are errors of grammar and possibly content that I did not discover before finalizing the note.              [1]   Past Medical History:  Diagnosis Date    Seasonal allergies    [2]    Allergies  Allergen Reactions    Buckwheat Anaphylaxis